# Patient Record
Sex: FEMALE | Race: WHITE | NOT HISPANIC OR LATINO | Employment: STUDENT | URBAN - METROPOLITAN AREA
[De-identification: names, ages, dates, MRNs, and addresses within clinical notes are randomized per-mention and may not be internally consistent; named-entity substitution may affect disease eponyms.]

---

## 2024-04-02 DIAGNOSIS — M54.2 CERVICALGIA: Primary | ICD-10-CM

## 2024-04-02 RX ORDER — METHYLPREDNISOLONE 4 MG/1
TABLET ORAL
Qty: 21 TABLET | Refills: 0 | Status: SHIPPED | OUTPATIENT
Start: 2024-04-02 | End: 2024-04-09

## 2024-04-02 RX ORDER — CYCLOBENZAPRINE HCL 10 MG
10 TABLET ORAL EVERY 8 HOURS PRN
Qty: 30 TABLET | Refills: 1 | Status: SHIPPED | OUTPATIENT
Start: 2024-04-02 | End: 2024-06-01

## 2024-04-26 ENCOUNTER — APPOINTMENT (OUTPATIENT)
Dept: PHYSICAL THERAPY | Facility: CLINIC | Age: 34
End: 2024-04-26
Payer: COMMERCIAL

## 2024-05-10 ENCOUNTER — APPOINTMENT (OUTPATIENT)
Dept: PHYSICAL THERAPY | Facility: CLINIC | Age: 34
End: 2024-05-10
Payer: COMMERCIAL

## 2024-05-31 ENCOUNTER — APPOINTMENT (OUTPATIENT)
Dept: PHYSICAL THERAPY | Facility: CLINIC | Age: 34
End: 2024-05-31
Payer: COMMERCIAL

## 2024-06-17 ENCOUNTER — APPOINTMENT (OUTPATIENT)
Dept: PHYSICAL THERAPY | Facility: CLINIC | Age: 34
End: 2024-06-17
Payer: COMMERCIAL

## 2025-02-13 ENCOUNTER — INITIAL PRENATAL (OUTPATIENT)
Facility: CLINIC | Age: 35
End: 2025-02-13
Payer: COMMERCIAL

## 2025-02-13 VITALS — SYSTOLIC BLOOD PRESSURE: 118 MMHG | BODY MASS INDEX: 22.1 KG/M2 | WEIGHT: 140.8 LBS | DIASTOLIC BLOOD PRESSURE: 78 MMHG

## 2025-02-13 DIAGNOSIS — Z87.59 HISTORY OF CHOLESTASIS DURING PREGNANCY: ICD-10-CM

## 2025-02-13 DIAGNOSIS — O99.119 FACTOR V LEIDEN MUTATION AFFECTING PREGNANCY (MULTI): Primary | ICD-10-CM

## 2025-02-13 DIAGNOSIS — Z87.59 HISTORY OF PLACENTA ABRUPTION: ICD-10-CM

## 2025-02-13 DIAGNOSIS — Z3A.08 8 WEEKS GESTATION OF PREGNANCY (HHS-HCC): ICD-10-CM

## 2025-02-13 DIAGNOSIS — D68.51 FACTOR V LEIDEN MUTATION AFFECTING PREGNANCY (MULTI): Primary | ICD-10-CM

## 2025-02-13 DIAGNOSIS — Z87.19 HISTORY OF CHOLESTASIS DURING PREGNANCY: ICD-10-CM

## 2025-02-13 DIAGNOSIS — O09.299 H/O SHOULDER DYSTOCIA IN PRIOR PREGNANCY, CURRENTLY PREGNANT (HHS-HCC): ICD-10-CM

## 2025-02-13 PROCEDURE — H1000 PRENATAL CARE ATRISK ASSESSM: HCPCS | Performed by: OBSTETRICS & GYNECOLOGY

## 2025-02-13 PROCEDURE — 99214 OFFICE O/P EST MOD 30 MIN: CPT | Performed by: OBSTETRICS & GYNECOLOGY

## 2025-02-13 PROCEDURE — 88175 CYTOPATH C/V AUTO FLUID REDO: CPT

## 2025-02-13 RX ORDER — BUPRENORPHINE HYDROCHLORIDE 8 MG/1
TABLET SUBLINGUAL
COMMUNITY

## 2025-02-13 RX ORDER — BUPRENORPHINE 7.5 UG/H
PATCH TRANSDERMAL
COMMUNITY
Start: 2020-06-13 | End: 2025-02-13 | Stop reason: ALTCHOICE

## 2025-02-13 RX ORDER — CLONAZEPAM 2 MG/1
TABLET ORAL 2 TIMES DAILY PRN
COMMUNITY

## 2025-02-13 RX ORDER — ENOXAPARIN SODIUM 100 MG/ML
40 INJECTION SUBCUTANEOUS 2 TIMES DAILY
Qty: 60 EACH | Refills: 2 | Status: SHIPPED | OUTPATIENT
Start: 2025-02-13

## 2025-02-13 RX ORDER — NAPROXEN SODIUM 220 MG/1
1 TABLET, FILM COATED ORAL DAILY
COMMUNITY
Start: 2022-11-21

## 2025-02-13 RX ORDER — BUPROPION HYDROCHLORIDE 300 MG/1
300 TABLET ORAL
COMMUNITY
Start: 2024-09-19

## 2025-02-13 RX ORDER — FOLIC ACID 1 MG/1
TABLET ORAL
COMMUNITY
Start: 2024-08-06

## 2025-02-13 RX ORDER — DIAZEPAM 10 MG/1
1 TABLET ORAL
COMMUNITY
Start: 2025-02-01

## 2025-02-13 ASSESSMENT — EDINBURGH POSTNATAL DEPRESSION SCALE (EPDS)
TOTAL SCORE: 0
I HAVE FELT SAD OR MISERABLE: NO, NOT AT ALL
I HAVE FELT SCARED OR PANICKY FOR NO GOOD REASON: NO, NOT AT ALL
I HAVE BLAMED MYSELF UNNECESSARILY WHEN THINGS WENT WRONG: NO, NEVER
I HAVE BEEN SO UNHAPPY THAT I HAVE BEEN CRYING: NO, NEVER
I HAVE LOOKED FORWARD WITH ENJOYMENT TO THINGS: AS MUCH AS I EVER DID
I HAVE BEEN ANXIOUS OR WORRIED FOR NO GOOD REASON: NO, NOT AT ALL
THINGS HAVE BEEN GETTING ON TOP OF ME: NO, I HAVE BEEN COPING AS WELL AS EVER
I HAVE BEEN ABLE TO LAUGH AND SEE THE FUNNY SIDE OF THINGS: AS MUCH AS I ALWAYS COULD
I HAVE BEEN SO UNHAPPY THAT I HAVE HAD DIFFICULTY SLEEPING: NOT AT ALL
THE THOUGHT OF HARMING MYSELF HAS OCCURRED TO ME: NEVER

## 2025-02-13 NOTE — PROGRESS NOTES
Ob Visit  25     SUBJECTIVE      HPI: Trinh Fiore is a 34 y.o.  at 8w1d here for RPNV.  She has no contractions, no bleeding, or no LOF. Reports no fetal movement. Patient reports being here for her first OB intake visit.  She has a complicated history including having factor V Leiden deficiency which she is needed Lovenox in the past for.  She also has a history of a shoulder dystocia in the past and a placental abruption in the past.  She also has a history of cholestasis of pregnancy which she delivered at 36 weeks.       OBJECTIVE  Visit Vitals  /78   Wt 63.9 kg (140 lb 12.8 oz)   LMP 2024   BMI 22.10 kg/m²   OB Status Pregnant   Smoking Status Former   BSA 1.74 m²            ASSESSMENT & PLAN    Trinh Fiore is a 34 y.o.  at 8w1d here for the following concerns we addressed today:    Problem List Items Addressed This Visit       Factor V Leiden mutation affecting pregnancy (Multi) - Primary    Overview     Lovenox 40mg bid  Heme onc consult         Relevant Medications    aspirin 81 mg chewable tablet    enoxaparin (Lovenox) 40 mg/0.4 mL syringe    Other Relevant Orders    C. trachomatis / N. gonorrhoeae, Amplified, Urogenital    CBC Anemia Panel With Reflex,Pregnancy    Hemoglobin Identification with Path Review    Hepatitis B Core Antibody, Total    Hepatitis B Surface Antibody    Hepatitis B surface Ag    Hepatitis C Antibody    HIV 1/2 Antigen/Antibody Screen with Reflex to Confirmation    Rubella IgG    Type And Screen Is this order related to pregnancy or an upcoming surgery? No    Urine culture    Syphilis Screen with Reflex    Referral To Hematology and Oncology    [facility] OB imaging order    H/O shoulder dystocia in prior pregnancy, currently pregnant ([facility])    History of placenta abruption    8 weeks gestation of pregnancy ([facility])    Overview     Desired provider in labor: [] CNM  [x] Physician   [] Either Acceptable  [x] Blood Products: [] Yes, accepts  [] No, needs counseling  [x] Initial BMI: 20.40   [x] Prenatal Labs:   [x] Cervical Cancer Screening up to date  [x] Rh status:   [] Screen for IPV and Substance Use Risk:  [] Genetic Screening (cfDNA):    [] First Trimester Anatomy Screen (11-13.6 wks):  [] Baby ASA (initiated):  [] Pregnancy dated by:     [] Anatomy US: (19-20 wks)  [] Federal Sterilization consent signed (if indicated):  [] 1hr GCT at 24-28wks:  [] Rhogam (if indicated):   [] Fetal Surveillance (if indicated):  [] Tdap (27-32 wks, may be given up to 36 wks if initial window missed):   [] RSV (32-36 wks) (Sept. to end of Jan):     [] Feeding Intentions:  [] Postpartum Birth control method:   [] GBS at 36 - 37 wks:  [] 39 weeks discussion of IOL vs. Expectant management:  [] Mode of delivery ( anticipated ):               RTC in 2 weeks      Wilfrido Méndez DO

## 2025-02-15 LAB
BACTERIA UR CULT: NORMAL
C TRACH RRNA SPEC QL NAA+PROBE: NORMAL

## 2025-02-18 DIAGNOSIS — Z3A.08 8 WEEKS GESTATION OF PREGNANCY (HHS-HCC): ICD-10-CM

## 2025-02-18 PROCEDURE — 83021 HEMOGLOBIN CHROMOTOGRAPHY: CPT

## 2025-02-18 PROCEDURE — 86850 RBC ANTIBODY SCREEN: CPT

## 2025-02-18 PROCEDURE — 86900 BLOOD TYPING SEROLOGIC ABO: CPT

## 2025-02-18 PROCEDURE — 85027 COMPLETE CBC AUTOMATED: CPT

## 2025-02-18 PROCEDURE — 86901 BLOOD TYPING SEROLOGIC RH(D): CPT

## 2025-02-19 ENCOUNTER — LAB (OUTPATIENT)
Dept: LAB | Facility: HOSPITAL | Age: 35
End: 2025-02-19
Payer: COMMERCIAL

## 2025-02-19 ENCOUNTER — TELEPHONE (OUTPATIENT)
Dept: OBSTETRICS AND GYNECOLOGY | Facility: CLINIC | Age: 35
End: 2025-02-19

## 2025-02-19 LAB
ABO GROUP (TYPE) IN BLOOD: NORMAL
ANTIBODY SCREEN: NORMAL
C TRACH RRNA SPEC QL NAA+PROBE: NOT DETECTED
ERYTHROCYTE [DISTWIDTH] IN BLOOD BY AUTOMATED COUNT: 12.2 % (ref 11.5–14.5)
HCT VFR BLD AUTO: 34.4 % (ref 36–46)
HGB BLD-MCNC: 11 G/DL (ref 12–16)
MCH RBC QN AUTO: 29.3 PG (ref 26–34)
MCHC RBC AUTO-ENTMCNC: 32 G/DL (ref 32–36)
MCV RBC AUTO: 92 FL (ref 80–100)
N GONORRHOEA RRNA SPEC QL NAA+PROBE: NOT DETECTED
NRBC BLD-RTO: 0 /100 WBCS (ref 0–0)
PLATELET # BLD AUTO: 157 X10*3/UL (ref 150–450)
QUEST GC CT AMPLIFIED (ALWAYS MESSAGE): NORMAL
RBC # BLD AUTO: 3.76 X10*6/UL (ref 4–5.2)
REFLEX ADDED, ANEMIA PANEL: NORMAL
RH FACTOR (ANTIGEN D): NORMAL
WBC # BLD AUTO: 6.5 X10*3/UL (ref 4.4–11.3)

## 2025-02-20 ENCOUNTER — APPOINTMENT (OUTPATIENT)
Facility: CLINIC | Age: 35
End: 2025-02-20
Payer: COMMERCIAL

## 2025-02-20 LAB
HEMOGLOBIN A2: 3 % (ref 2–3.5)
HEMOGLOBIN A: 95.4 % (ref 95.8–98)
HEMOGLOBIN F: 1.6 % (ref 0–2)
HEMOGLOBIN IDENTIFICATION INTERPRETATION: NORMAL
PATH REVIEW-HGB IDENTIFICATION: ABNORMAL

## 2025-02-21 LAB
HBV CORE AB SERPL QL IA: NORMAL
HBV SURFACE AB SERPL IA-ACNC: <5 MIU/ML
HBV SURFACE AG SERPL QL IA: NORMAL
HCV AB SERPL QL IA: NORMAL
HIV 1+2 AB+HIV1 P24 AG SERPL QL IA: NORMAL
RUBV IGG SERPL IA-ACNC: <0.9 INDEX
T PALLIDUM AB SER QL IA: NEGATIVE

## 2025-02-27 LAB
CYTOLOGY CMNT CVX/VAG CYTO-IMP: NORMAL
LAB AP HPV GENOTYPE QUESTION: YES
LAB AP HPV HR: NORMAL
LABORATORY COMMENT REPORT: NORMAL
LMP START DATE: NORMAL
PATH REPORT.TOTAL CANCER: NORMAL

## 2025-03-04 ENCOUNTER — APPOINTMENT (OUTPATIENT)
Dept: RADIOLOGY | Facility: CLINIC | Age: 35
End: 2025-03-04
Payer: COMMERCIAL

## 2025-03-05 ENCOUNTER — HOSPITAL ENCOUNTER (OUTPATIENT)
Dept: RADIOLOGY | Facility: HOSPITAL | Age: 35
Discharge: HOME | End: 2025-03-05
Payer: COMMERCIAL

## 2025-03-05 DIAGNOSIS — D68.51 FACTOR V LEIDEN MUTATION AFFECTING PREGNANCY (MULTI): ICD-10-CM

## 2025-03-05 DIAGNOSIS — O99.119 FACTOR V LEIDEN MUTATION AFFECTING PREGNANCY (MULTI): ICD-10-CM

## 2025-03-05 PROCEDURE — 76813 OB US NUCHAL MEAS 1 GEST: CPT

## 2025-03-11 ENCOUNTER — TELEMEDICINE (OUTPATIENT)
Dept: OBSTETRICS AND GYNECOLOGY | Facility: HOSPITAL | Age: 35
End: 2025-03-11
Payer: COMMERCIAL

## 2025-03-11 ENCOUNTER — APPOINTMENT (OUTPATIENT)
Dept: OBSTETRICS AND GYNECOLOGY | Facility: HOSPITAL | Age: 35
End: 2025-03-11
Payer: COMMERCIAL

## 2025-03-11 ENCOUNTER — PATIENT MESSAGE (OUTPATIENT)
Dept: OBSTETRICS AND GYNECOLOGY | Facility: HOSPITAL | Age: 35
End: 2025-03-11

## 2025-03-11 DIAGNOSIS — O99.320: ICD-10-CM

## 2025-03-11 DIAGNOSIS — F19.20: ICD-10-CM

## 2025-03-11 DIAGNOSIS — D68.51 FACTOR V LEIDEN MUTATION AFFECTING PREGNANCY (MULTI): Primary | ICD-10-CM

## 2025-03-11 DIAGNOSIS — O26.20 HIGH RISK PREGNANCY DUE TO RECURRENT MISCARRIAGE (HHS-HCC): ICD-10-CM

## 2025-03-11 DIAGNOSIS — Z87.59 HISTORY OF CHOLESTASIS DURING PREGNANCY: ICD-10-CM

## 2025-03-11 DIAGNOSIS — O99.341 ANXIETY DURING PREGNANCY IN FIRST TRIMESTER, ANTEPARTUM (HHS-HCC): ICD-10-CM

## 2025-03-11 DIAGNOSIS — O99.119 FACTOR V LEIDEN MUTATION AFFECTING PREGNANCY (MULTI): Primary | ICD-10-CM

## 2025-03-11 DIAGNOSIS — B96.89 ACUTE BACTERIAL SINUSITIS: ICD-10-CM

## 2025-03-11 DIAGNOSIS — F41.9 ANXIETY DURING PREGNANCY IN FIRST TRIMESTER, ANTEPARTUM (HHS-HCC): ICD-10-CM

## 2025-03-11 DIAGNOSIS — J01.90 ACUTE BACTERIAL SINUSITIS: ICD-10-CM

## 2025-03-11 DIAGNOSIS — Z3A.11 11 WEEKS GESTATION OF PREGNANCY (HHS-HCC): ICD-10-CM

## 2025-03-11 DIAGNOSIS — Z87.19 HISTORY OF CHOLESTASIS DURING PREGNANCY: ICD-10-CM

## 2025-03-11 PROBLEM — Q21.12 PATENT FORAMEN OVALE (HHS-HCC): Status: ACTIVE | Noted: 2025-03-11

## 2025-03-11 PROBLEM — O09.891 HISTORY OF PRETERM DELIVERY, CURRENTLY PREGNANT IN FIRST TRIMESTER (HHS-HCC): Status: ACTIVE | Noted: 2025-03-11

## 2025-03-11 PROBLEM — O09.891 HISTORY OF PRETERM DELIVERY, CURRENTLY PREGNANT IN FIRST TRIMESTER (HHS-HCC): Status: RESOLVED | Noted: 2025-03-11 | Resolved: 2025-03-11

## 2025-03-11 PROBLEM — O09.291: Status: ACTIVE | Noted: 2025-02-13

## 2025-03-11 PROCEDURE — 99214 OFFICE O/P EST MOD 30 MIN: CPT | Performed by: STUDENT IN AN ORGANIZED HEALTH CARE EDUCATION/TRAINING PROGRAM

## 2025-03-11 RX ORDER — ENOXAPARIN SODIUM 100 MG/ML
40 INJECTION SUBCUTANEOUS DAILY
Qty: 90 EACH | Refills: 3 | Status: SHIPPED | OUTPATIENT
Start: 2025-03-11 | End: 2026-03-11

## 2025-03-11 RX ORDER — LORAZEPAM 2 MG/1
2 TABLET ORAL 2 TIMES DAILY
COMMUNITY

## 2025-03-11 RX ORDER — AMOXICILLIN AND CLAVULANATE POTASSIUM 875; 125 MG/1; MG/1
875 TABLET, FILM COATED ORAL 2 TIMES DAILY
Qty: 14 TABLET | Refills: 0 | Status: SHIPPED | OUTPATIENT
Start: 2025-03-11 | End: 2025-03-18

## 2025-03-11 NOTE — PROGRESS NOTES
Ob Visit  25     SUBJECTIVE      HPI: Trinh Fiore is a 34 y.o.  at 11w6d here for RPNV.  She has no contractions, no bleeding, and no LOF.     Patient reports upper respiratory symptoms 2-3 weeks. Developed purulent sputum in the last week. Symptoms essentially stable. Partner's brother passed away very suddenly recently and this has been a source of stress.    Due to an acute illness in her child necessitating alternate childcare arrangements today, she was unable to present in person and today's visit was completed by phone.       OBJECTIVE  Visit Vitals  LMP 2024   OB Status Pregnant   Smoking Status Former            ASSESSMENT & PLAN    Trinh Fiore is a 34 y.o.  at 11w6d here for the following concerns we addressed today:    Substance dependence during pregnancy, antepartum (Multi)  - in recovery  - sober of opiates since 2018  - continues on maintenance therapy with Subutex 8 mg recently uptitrated to four times daily  - interested in referral to [facility], will refer for shared care    11 weeks gestation of pregnancy ([facility])  - care complete to date  - cf DNA ordered today, aware she will need to retrieve a kit from office along with paper order forms    Factor V Leiden mutation affecting pregnancy (Multi)  - heterozygous  - no personal history of VTE, only superficial phlebitis, but was treated with anticoagulation given superficial clot in context of PFO  - mom with TIA leading to diagnosis of factor V, had episodes of VTE as well  - per ACOG guidance as well as MFM consultation in  discussed recommendation for prophylactic dosing of Lovenox with 40 mg daily    Anxiety during pregnancy in first trimester, antepartum ([facility])  - increased symptomatology in the last year  - experiences panic attacks, also with increasing frequency recently  - current regimen Wellbutrin 300 mg daily, clonazepam 2 mg twice daily PRN (endorses consistent twice daily use,  wants to wean), and Ativan 2 mg twice daily PRN (endorses consistent twice daily use, wants to wean)  - interested in referral to Dr. Hare, will place referral    History of cholestasis during pregnancy  - experiencing generalized pruritus, discussed highly unlikely to be related to cholestasis, CMP and bile acids obtained for reassurance        Orders Placed This Encounter   Procedures    Bile Acids, Total    Comprehensive Metabolic Panel    [facility] Prenatal Screen     Acute bacterial sinusitis  - based on history  - reviewed current evidence that even bacterial sinusitis may resolve without treatment but that antibiotic therapy may shorten course and she does desire antibiotics at this time  - rx sent for augmentin    RTC in 4 weeks      Peace Fierro MD

## 2025-03-13 ENCOUNTER — TELEPHONE (OUTPATIENT)
Dept: OBSTETRICS AND GYNECOLOGY | Facility: CLINIC | Age: 35
End: 2025-03-13

## 2025-03-13 ENCOUNTER — APPOINTMENT (OUTPATIENT)
Facility: CLINIC | Age: 35
End: 2025-03-13
Payer: COMMERCIAL

## 2025-03-13 PROBLEM — O99.341 ANXIETY DURING PREGNANCY IN FIRST TRIMESTER, ANTEPARTUM (HHS-HCC): Status: ACTIVE | Noted: 2025-03-13

## 2025-03-13 PROBLEM — F41.9 ANXIETY DURING PREGNANCY IN FIRST TRIMESTER, ANTEPARTUM (HHS-HCC): Status: ACTIVE | Noted: 2025-03-13

## 2025-03-13 NOTE — ASSESSMENT & PLAN NOTE
- increased symptomatology in the last year  - experiences panic attacks, also with increasing frequency recently  - current regimen Wellbutrin 300 mg daily, clonazepam 2 mg twice daily PRN (endorses consistent twice daily use, wants to wean), and Ativan 2 mg twice daily PRN (endorses consistent twice daily use, wants to wean)  - interested in referral to Dr. Hare, will place referral

## 2025-03-13 NOTE — TELEPHONE ENCOUNTER
HENRIETTA/TERRI called patient after receiving a referral from Peace Fierro MD. SW introduced self, explained role and provided additional information about [facility]. SW addressed questions scheduled patient in [facility] and concluded on a date and time to complete intake. HENRIETTA provided contact information and address to [place] and encouraged patient to contact HENRIETTA/TERRI if she have additional questions or needs before scheduled appointment.      no

## 2025-03-13 NOTE — ASSESSMENT & PLAN NOTE
- in recovery  - sober of opiates since August 2018  - continues on maintenance therapy with Subutex 8 mg recently uptitrated to four times daily  - interested in referral to [facility], will refer for shared care

## 2025-03-13 NOTE — ASSESSMENT & PLAN NOTE
- experiencing generalized pruritus, discussed highly unlikely to be related to cholestasis, CMP and bile acids obtained for reassurance

## 2025-03-13 NOTE — ASSESSMENT & PLAN NOTE
- care complete to date  - cf DNA ordered today, aware she will need to retrieve a kit from office along with paper order forms

## 2025-03-13 NOTE — ASSESSMENT & PLAN NOTE
- heterozygous  - no personal history of VTE, only superficial phlebitis, but was treated with anticoagulation given superficial clot in context of PFO  - mom with TIA leading to diagnosis of factor V, had episodes of VTE as well  - per ACOG guidance as well as M consultation in 2022 discussed recommendation for prophylactic dosing of Lovenox with 40 mg daily

## 2025-03-14 ENCOUNTER — TELEPHONE (OUTPATIENT)
Dept: OBSTETRICS AND GYNECOLOGY | Facility: HOSPITAL | Age: 35
End: 2025-03-14

## 2025-03-14 DIAGNOSIS — O99.341 ANXIETY DURING PREGNANCY IN FIRST TRIMESTER, ANTEPARTUM (HHS-HCC): Primary | ICD-10-CM

## 2025-03-14 DIAGNOSIS — F41.9 ANXIETY DURING PREGNANCY IN FIRST TRIMESTER, ANTEPARTUM (HHS-HCC): Primary | ICD-10-CM

## 2025-03-14 PROCEDURE — 36415 COLL VENOUS BLD VENIPUNCTURE: CPT

## 2025-03-15 ENCOUNTER — LAB (OUTPATIENT)
Dept: LAB | Facility: HOSPITAL | Age: 35
End: 2025-03-15
Payer: COMMERCIAL

## 2025-03-15 ENCOUNTER — TELEPHONE (OUTPATIENT)
Dept: OBSTETRICS AND GYNECOLOGY | Facility: HOSPITAL | Age: 35
End: 2025-03-15
Payer: COMMERCIAL

## 2025-03-15 DIAGNOSIS — O26.20 PREGNANCY CARE FOR PATIENT WITH RECURRENT PREGNANCY LOSS, UNSPECIFIED TRIMESTER: Primary | ICD-10-CM

## 2025-03-15 NOTE — TELEPHONE ENCOUNTER
----- Message from Nurse Yolette MEDRANO sent at 3/14/2025  6:14 PM EDT -----  Regarding: Panic attack in pregnancy  Hi Tameka, I am not very good at scheduling. I put this pt on your schedule for Tuesday at 10:30am. If you have to remove it I understand. She is having a lot of anxiety and panic attacks with this pregnancy. She is a pt of Dr. Fierro.  She has a prescriber for her psych meds however he is currently booked. She has a therapist she talks too and will talk to her on Saturday, she has coping strategies but nothing is working. She has other children and can't go to the ED. Let me Know if this is okay or not. I'm really concerned for her. Thanks      Called patient to talk about the fact that her appointment time on Tuesday wouldn't work given the other patients currently on my schedule. Identity confirmed x2. Patient states that she actually doesn't want to keep the appointment with me as she got an appointment with Dr Orlando for 3/20. Discussed I would cancel appointment with me, but that if she needed anything to call back. Patient voiced acceptance and understanding of plan of care. All questions answered.     Tameka Espinal, JERE-LEXI

## 2025-03-17 ENCOUNTER — APPOINTMENT (OUTPATIENT)
Dept: OBSTETRICS AND GYNECOLOGY | Facility: HOSPITAL | Age: 35
End: 2025-03-17
Payer: COMMERCIAL

## 2025-03-18 ENCOUNTER — APPOINTMENT (OUTPATIENT)
Dept: OBSTETRICS AND GYNECOLOGY | Facility: CLINIC | Age: 35
End: 2025-03-18
Payer: COMMERCIAL

## 2025-03-18 ENCOUNTER — TELEPHONE (OUTPATIENT)
Dept: OBSTETRICS AND GYNECOLOGY | Facility: CLINIC | Age: 35
End: 2025-03-18
Payer: COMMERCIAL

## 2025-03-18 NOTE — TELEPHONE ENCOUNTER
----- Message from Nurse Sabine QUESADA sent at 3/17/2025  2:43 PM EDT -----  Regarding: Psych appointments  Offer 3/26 1600 at midwn in person with Dr Hare.   If doesn't want to wait that long, she can see Darion Nieves on 3/20 (Thursday) at 1500. and then see Payam on 3/26.    Called patient, left message on voice mail.    3/18/2025 - Reached patient by phone, agreeable to waiting until 3/26/25 to see Dr Hare.  States does not need to see Dr Orlando this week.  Dr Orlando appointment cancelled.  Dr Holliday appointment request sent to clerical staff.

## 2025-03-19 RX ORDER — HYDROXYZINE HYDROCHLORIDE 10 MG/1
25 TABLET, FILM COATED ORAL EVERY 4 HOURS PRN
Qty: 90 TABLET | Refills: 3 | Status: SHIPPED | OUTPATIENT
Start: 2025-03-19 | End: 2026-03-19

## 2025-03-19 RX ORDER — TRAZODONE HYDROCHLORIDE 50 MG/1
50 TABLET ORAL NIGHTLY
Qty: 30 TABLET | Refills: 0 | Status: SHIPPED | OUTPATIENT
Start: 2025-03-19 | End: 2025-04-18

## 2025-03-20 ENCOUNTER — APPOINTMENT (OUTPATIENT)
Dept: OBSTETRICS AND GYNECOLOGY | Facility: CLINIC | Age: 35
End: 2025-03-20
Payer: COMMERCIAL

## 2025-03-21 LAB
COMMENTS - MP RESULT TYPE: NORMAL
SCAN RESULT: NORMAL

## 2025-03-26 ENCOUNTER — TELEMEDICINE (OUTPATIENT)
Dept: BEHAVIORAL HEALTH | Facility: CLINIC | Age: 35
End: 2025-03-26
Payer: COMMERCIAL

## 2025-03-26 DIAGNOSIS — F11.20: ICD-10-CM

## 2025-03-26 DIAGNOSIS — F41.1 GAD (GENERALIZED ANXIETY DISORDER): ICD-10-CM

## 2025-03-26 DIAGNOSIS — O99.342 DEPRESSION AFFECTING PREGNANCY IN SECOND TRIMESTER, ANTEPARTUM (HHS-HCC): ICD-10-CM

## 2025-03-26 DIAGNOSIS — F32.A DEPRESSION AFFECTING PREGNANCY IN SECOND TRIMESTER, ANTEPARTUM (HHS-HCC): ICD-10-CM

## 2025-03-26 PROCEDURE — 90792 PSYCH DIAG EVAL W/MED SRVCS: CPT | Performed by: STUDENT IN AN ORGANIZED HEALTH CARE EDUCATION/TRAINING PROGRAM
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
## 2025-03-27 ENCOUNTER — TELEPHONE (OUTPATIENT)
Dept: OBSTETRICS AND GYNECOLOGY | Facility: CLINIC | Age: 35
End: 2025-03-27
Payer: COMMERCIAL

## 2025-03-27 RX ORDER — BUPRENORPHINE HYDROCHLORIDE 8 MG/1
8 TABLET SUBLINGUAL 4 TIMES DAILY
Qty: 120 TABLET | Refills: 2 | Status: SHIPPED | OUTPATIENT
Start: 2025-04-14 | End: 2025-07-13

## 2025-03-27 RX ORDER — BUPROPION HYDROCHLORIDE 150 MG/1
150 TABLET ORAL EVERY MORNING
Qty: 30 TABLET | Refills: 11 | Status: SHIPPED | OUTPATIENT
Start: 2025-03-27 | End: 2026-03-27

## 2025-03-27 RX ORDER — CLONAZEPAM 2 MG/1
2 TABLET ORAL 3 TIMES DAILY PRN
Qty: 45 TABLET | Refills: 1 | Status: SHIPPED | OUTPATIENT
Start: 2025-04-09 | End: 2025-03-28 | Stop reason: SDUPTHER

## 2025-03-27 NOTE — TELEPHONE ENCOUNTER
[facility] Coordinator called patient as scheduled attempting to complete intake. SW left a message and requested a call back. SW also observed that the appointment scheduled for patient in [facility] clinic on 4/1 was cancelled.

## 2025-03-28 ENCOUNTER — TELEPHONE (OUTPATIENT)
Dept: OBSTETRICS AND GYNECOLOGY | Facility: CLINIC | Age: 35
End: 2025-03-28
Payer: COMMERCIAL

## 2025-03-28 DIAGNOSIS — F41.1 GAD (GENERALIZED ANXIETY DISORDER): ICD-10-CM

## 2025-03-28 RX ORDER — CLONAZEPAM 2 MG/1
2 TABLET ORAL 3 TIMES DAILY PRN
Qty: 45 TABLET | Refills: 1 | Status: SHIPPED | OUTPATIENT
Start: 2025-03-28 | End: 2025-04-27

## 2025-03-28 NOTE — TELEPHONE ENCOUNTER
[facility] Coordinator received a call from patient stating that she missed a call regarding intake appointment. Patient explained that she has been linked with Dr. Hare and has all other necessary resources so she no longer wish to be scheduled in [facility] clinic. Coordinator encouraged patient to contact Coordinator if something changes or if she have questions or needs.

## 2025-04-01 ENCOUNTER — APPOINTMENT (OUTPATIENT)
Dept: OBSTETRICS AND GYNECOLOGY | Facility: CLINIC | Age: 35
End: 2025-04-01
Payer: COMMERCIAL

## 2025-04-01 DIAGNOSIS — F11.20: ICD-10-CM

## 2025-04-01 RX ORDER — BUPRENORPHINE HYDROCHLORIDE 8 MG/1
8 TABLET SUBLINGUAL 4 TIMES DAILY
Qty: 120 TABLET | Refills: 2 | Status: SHIPPED | OUTPATIENT
Start: 2025-04-01 | End: 2025-06-30

## 2025-04-03 ENCOUNTER — TELEPHONE (OUTPATIENT)
Dept: HEMATOLOGY/ONCOLOGY | Facility: HOSPITAL | Age: 35
End: 2025-04-03
Payer: COMMERCIAL

## 2025-04-03 NOTE — TELEPHONE ENCOUNTER
Call placed to patient regarding missed new patient visit with Dr. Garcia. Not able to reach patient left VM, informing patient to return phone call to be rescheduled with Dr. Garcia.

## 2025-04-04 ENCOUNTER — DOCUMENTATION (OUTPATIENT)
Dept: HEMATOLOGY/ONCOLOGY | Facility: HOSPITAL | Age: 35
End: 2025-04-04
Payer: COMMERCIAL

## 2025-04-04 ENCOUNTER — APPOINTMENT (OUTPATIENT)
Dept: RADIOLOGY | Facility: HOSPITAL | Age: 35
End: 2025-04-04
Payer: COMMERCIAL

## 2025-04-04 NOTE — PROGRESS NOTES
Dr. Fierro and Dr. Méndez aware that patient missed her benign heme appointment with Dr. Garcia. Dr. Fierro said that she will manage the patient.

## 2025-04-08 ENCOUNTER — TELEPHONE (OUTPATIENT)
Dept: BEHAVIORAL HEALTH | Facility: CLINIC | Age: 35
End: 2025-04-08
Payer: COMMERCIAL

## 2025-04-08 DIAGNOSIS — F41.1 GAD (GENERALIZED ANXIETY DISORDER): ICD-10-CM

## 2025-04-08 RX ORDER — CLONAZEPAM 1 MG/1
1 TABLET ORAL NIGHTLY
Qty: 14 TABLET | Refills: 0 | Status: SHIPPED | OUTPATIENT
Start: 2025-04-08 | End: 2025-04-22

## 2025-04-09 ENCOUNTER — TELEMEDICINE (OUTPATIENT)
Dept: PRIMARY CARE | Facility: CLINIC | Age: 35
End: 2025-04-09
Payer: COMMERCIAL

## 2025-04-09 DIAGNOSIS — J00 ACUTE NASOPHARYNGITIS: Primary | ICD-10-CM

## 2025-04-09 PROCEDURE — 99202 OFFICE O/P NEW SF 15 MIN: CPT | Performed by: NURSE PRACTITIONER

## 2025-04-09 PROCEDURE — 1036F TOBACCO NON-USER: CPT | Performed by: NURSE PRACTITIONER

## 2025-04-09 NOTE — PROGRESS NOTES
Subjective   Patient ID: Trinh Fiore is a 34 y.o. female who presents for Illness.    Virtual or Telephone Consent    An interactive audio and video telecommunication system which permits real time communications between the patient (at the originating site) and provider (at the distant site) was utilized to provide this telehealth service.   Verbal consent was requested and obtained from Trinh Fiore on this date, 04/09/25 for a telehealth visit and the patient's location was confirmed at the time of the visit.  Patient is located in [place]    She woke up Monday and had sore throat, congestion, and headache started and through the day worsened.  She felt fatigued as well.   Today she feels slight improvement.  She has felt warm but did not take temperature.    She denies shortness of breath or chest pain, red cheeks, or rash  Her 12 year old became sick yesterday morning when he woke up  Her 2 year old became sick yesterday night with same symptoms - he does attend    Her 4 year old has not become ill yet  Currently pregnant 16 weeks - did talk with GYN about what she could take.  She has been taking tylenol cold         Illness         Review of Systems   All other systems reviewed and are negative.      Objective   LMP 12/18/2024     Physical Exam  Constitutional:       General: She is not in acute distress.     Appearance: Normal appearance.   HENT:      Head: Normocephalic and atraumatic.      Right Ear: External ear normal.      Left Ear: External ear normal.      Nose: Nose normal.      Mouth/Throat:      Mouth: Mucous membranes are moist.   Eyes:      Extraocular Movements: Extraocular movements intact.      Conjunctiva/sclera: Conjunctivae normal.      Pupils: Pupils are equal, round, and reactive to light.   Pulmonary:      Effort: Pulmonary effort is normal.   Neurological:      Mental Status: She is alert and oriented to person, place, and time.   Psychiatric:         Mood and Affect:  Mood normal.         Behavior: Behavior normal.         Thought Content: Thought content normal.         Judgment: Judgment normal.         Assessment/Plan   Problem List Items Addressed This Visit             ICD-10-CM    Acute nasopharyngitis - Primary J00     - discussed medications safe in pregnancy.  May continue tylenol cold.  May take OTC robitussin   -  increase fluids  -  nasal saline  -  hand hygiene and infection prevention discussed  - follow up with pcp if no improvement in 3-5 days

## 2025-04-09 NOTE — ASSESSMENT & PLAN NOTE
- discussed medications safe in pregnancy.  May continue tylenol cold.  May take OTC robitussin   -  increase fluids  -  nasal saline  -  hand hygiene and infection prevention discussed  - follow up with pcp if no improvement in 3-5 days

## 2025-04-11 ENCOUNTER — APPOINTMENT (OUTPATIENT)
Dept: OBSTETRICS AND GYNECOLOGY | Facility: CLINIC | Age: 35
End: 2025-04-11
Payer: COMMERCIAL

## 2025-04-11 ENCOUNTER — PATIENT OUTREACH (OUTPATIENT)
Dept: HEMATOLOGY/ONCOLOGY | Facility: HOSPITAL | Age: 35
End: 2025-04-11

## 2025-04-11 NOTE — PROGRESS NOTES
RN talked to patient. Patient was referred by Dr. Méndez for factor V Leiden during pregnancy. Patient was tested in 2014 at [facility] and the result is in care every where. Patient was started on Lovenox by her OB. Patient is due 9/24/25. Patient aware of date, time and place. Call back instructions reviewed.

## 2025-04-14 ENCOUNTER — APPOINTMENT (OUTPATIENT)
Dept: HEMATOLOGY/ONCOLOGY | Facility: HOSPITAL | Age: 35
End: 2025-04-14
Payer: COMMERCIAL

## 2025-04-14 ENCOUNTER — TELEPHONE (OUTPATIENT)
Dept: BEHAVIORAL HEALTH | Facility: CLINIC | Age: 35
End: 2025-04-14
Payer: COMMERCIAL

## 2025-04-14 ENCOUNTER — APPOINTMENT (OUTPATIENT)
Dept: BEHAVIORAL HEALTH | Facility: CLINIC | Age: 35
End: 2025-04-14
Payer: COMMERCIAL

## 2025-04-14 DIAGNOSIS — F41.1 GAD (GENERALIZED ANXIETY DISORDER): ICD-10-CM

## 2025-04-14 RX ORDER — CLONAZEPAM 2 MG/1
TABLET ORAL
Qty: 105 TABLET | Refills: 0 | Status: SHIPPED | OUTPATIENT
Start: 2025-04-14 | End: 2025-04-18 | Stop reason: SDUPTHER

## 2025-04-14 NOTE — PROGRESS NOTES
Moreno the pharmacist is calling in from [facility] regarding medication below. He would like to speak with the provider. He states that the pts just filled a 15 day supply on the medication below on 04/09/2025 and he received another request to fill again. He also stated that it will be a 45 day supply in twelve days and he has questions for the provider. Please call [phone] opt 2 to speak directly to pharmacist.    clonazePAM (KlonoPIN) 2 mg tablet

## 2025-04-14 NOTE — TELEPHONE ENCOUNTER
Can you let them know it can be filled when the 14 day ones run out on 4/23, that I was just trying to consolidate the 1 mg and 2 mg tablets and change to a 30 day to start once the 2 week scripts finished.

## 2025-04-16 ENCOUNTER — TELEMEDICINE (OUTPATIENT)
Dept: PRIMARY CARE | Facility: CLINIC | Age: 35
End: 2025-04-16
Payer: COMMERCIAL

## 2025-04-16 DIAGNOSIS — J01.00 ACUTE NON-RECURRENT MAXILLARY SINUSITIS: Primary | ICD-10-CM

## 2025-04-16 PROCEDURE — 1036F TOBACCO NON-USER: CPT | Performed by: NURSE PRACTITIONER

## 2025-04-16 PROCEDURE — 99214 OFFICE O/P EST MOD 30 MIN: CPT | Performed by: NURSE PRACTITIONER

## 2025-04-16 RX ORDER — AMOXICILLIN AND CLAVULANATE POTASSIUM 875; 125 MG/1; MG/1
1 TABLET, FILM COATED ORAL 2 TIMES DAILY
Qty: 14 TABLET | Refills: 0 | Status: SHIPPED | OUTPATIENT
Start: 2025-04-16 | End: 2025-04-23

## 2025-04-16 NOTE — PROGRESS NOTES
Subjective   Patient ID: Trinh Fiore is a 34 y.o. female who presents for No chief complaint on file..    Virtual or Telephone Consent    An interactive audio and video telecommunication system which permits real time communications between the patient (at the originating site) and provider (at the distant site) was utilized to provide this telehealth service.   Verbal consent was requested and obtained from Trinh Fiore on this date, 04/16/25 for a telehealth visit and the patient's location was confirmed at the time of the visit.  Patient is located in [place]      Was seen one week ago for uri - symptoms have worsened of sinus congestion, rhinorrhea, sore throat, and productive cough x 11 days. Cough is productive of green mucous.  She denies fever, shortness of breath, or chest pain  She denies wheezing  She does notice cough worsens when she lays down.  She has been taking tylenol cold as directed by OB/GYN - but is not having improvement.  Her children who were sick are feeling better   She is 17 weeks pregnant           Review of Systems   All other systems reviewed and are negative.      Objective   LMP 12/18/2024     Physical Exam  Constitutional:       General: She is not in acute distress.     Appearance: Normal appearance. She is not toxic-appearing or diaphoretic.   HENT:      Head: Normocephalic and atraumatic.      Right Ear: External ear normal.      Left Ear: External ear normal.      Nose: Nose normal.      Mouth/Throat:      Mouth: Mucous membranes are moist.   Eyes:      Extraocular Movements: Extraocular movements intact.      Conjunctiva/sclera: Conjunctivae normal.      Pupils: Pupils are equal, round, and reactive to light.   Pulmonary:      Effort: Pulmonary effort is normal.   Neurological:      Mental Status: She is alert and oriented to person, place, and time.   Psychiatric:         Mood and Affect: Mood normal.         Behavior: Behavior normal.         Thought Content: Thought  content normal.         Judgment: Judgment normal.         Assessment/Plan   Problem List Items Addressed This Visit           ICD-10-CM    Acute non-recurrent maxillary sinusitis - Primary J01.00    - augmentin twice a day x 7 days  -  Nasal saline, increase fluids  -  hand hygiene and infection prevention discussed  -  Warm compress to sinuses  - humidification  - recommend to eat yogurt twice daily while taking antibiotic or a daily probiotic

## 2025-04-16 NOTE — ASSESSMENT & PLAN NOTE
- augmentin twice a day x 7 days  -  Nasal saline, increase fluids  -  hand hygiene and infection prevention discussed  -  Warm compress to sinuses  - humidification  - recommend to eat yogurt twice daily while taking antibiotic or a daily probiotic

## 2025-04-17 ENCOUNTER — TELEPHONE (OUTPATIENT)
Dept: BEHAVIORAL HEALTH | Facility: CLINIC | Age: 35
End: 2025-04-17
Payer: COMMERCIAL

## 2025-04-17 ENCOUNTER — PATIENT MESSAGE (OUTPATIENT)
Dept: OBSTETRICS AND GYNECOLOGY | Facility: CLINIC | Age: 35
End: 2025-04-17
Payer: COMMERCIAL

## 2025-04-17 DIAGNOSIS — F41.1 GAD (GENERALIZED ANXIETY DISORDER): ICD-10-CM

## 2025-04-17 NOTE — PROGRESS NOTES
Pt calling in. She states that she was talking to Dr. Hare's nurse earlier and would like her to give her a call back. [phone]

## 2025-04-18 RX ORDER — CLONAZEPAM 2 MG/1
TABLET ORAL
Qty: 105 TABLET | Refills: 0 | Status: SHIPPED | OUTPATIENT
Start: 2025-04-18 | End: 2025-05-18

## 2025-04-21 ENCOUNTER — APPOINTMENT (OUTPATIENT)
Dept: BEHAVIORAL HEALTH | Facility: HOSPITAL | Age: 35
End: 2025-04-21
Payer: COMMERCIAL

## 2025-04-22 ENCOUNTER — APPOINTMENT (OUTPATIENT)
Dept: OBSTETRICS AND GYNECOLOGY | Facility: HOSPITAL | Age: 35
End: 2025-04-22
Payer: COMMERCIAL

## 2025-04-25 ENCOUNTER — PATIENT MESSAGE (OUTPATIENT)
Dept: OBSTETRICS AND GYNECOLOGY | Facility: HOSPITAL | Age: 35
End: 2025-04-25

## 2025-04-25 ENCOUNTER — APPOINTMENT (OUTPATIENT)
Dept: OBSTETRICS AND GYNECOLOGY | Facility: CLINIC | Age: 35
End: 2025-04-25
Payer: COMMERCIAL

## 2025-04-25 DIAGNOSIS — N76.0 ACUTE VAGINITIS: ICD-10-CM

## 2025-04-25 DIAGNOSIS — N76.0 ACUTE VAGINITIS: Primary | ICD-10-CM

## 2025-04-25 RX ORDER — FLUCONAZOLE 150 MG/1
150 TABLET ORAL ONCE
Qty: 2 TABLET | Refills: 0 | Status: SHIPPED | OUTPATIENT
Start: 2025-04-25 | End: 2025-04-25

## 2025-04-25 RX ORDER — FLUCONAZOLE 150 MG/1
150 TABLET ORAL ONCE
Qty: 2 TABLET | Refills: 0 | Status: SHIPPED | OUTPATIENT
Start: 2025-04-25 | End: 2025-04-25 | Stop reason: SDUPTHER

## 2025-05-02 ENCOUNTER — HOSPITAL ENCOUNTER (OUTPATIENT)
Facility: HOSPITAL | Age: 35
Discharge: HOME | End: 2025-05-03
Attending: STUDENT IN AN ORGANIZED HEALTH CARE EDUCATION/TRAINING PROGRAM | Admitting: STUDENT IN AN ORGANIZED HEALTH CARE EDUCATION/TRAINING PROGRAM
Payer: COMMERCIAL

## 2025-05-02 ENCOUNTER — APPOINTMENT (OUTPATIENT)
Dept: RADIOLOGY | Facility: HOSPITAL | Age: 35
End: 2025-05-02
Payer: COMMERCIAL

## 2025-05-02 DIAGNOSIS — M54.12 CERVICAL RADICULOPATHY: Primary | ICD-10-CM

## 2025-05-02 PROCEDURE — 4500999001 HC ED NO CHARGE

## 2025-05-02 PROCEDURE — 99214 OFFICE O/P EST MOD 30 MIN: CPT

## 2025-05-02 PROCEDURE — 2500000002 HC RX 250 W HCPCS SELF ADMINISTERED DRUGS (ALT 637 FOR MEDICARE OP, ALT 636 FOR OP/ED): Mod: SE

## 2025-05-02 PROCEDURE — 2500000001 HC RX 250 WO HCPCS SELF ADMINISTERED DRUGS (ALT 637 FOR MEDICARE OP): Mod: SE

## 2025-05-02 RX ORDER — CLONAZEPAM 0.5 MG/1
1 TABLET ORAL ONCE
Status: COMPLETED | OUTPATIENT
Start: 2025-05-02 | End: 2025-05-02

## 2025-05-02 RX ORDER — ACETAMINOPHEN 325 MG/1
975 TABLET ORAL ONCE
Status: COMPLETED | OUTPATIENT
Start: 2025-05-02 | End: 2025-05-02

## 2025-05-02 RX ORDER — BUPRENORPHINE HYDROCHLORIDE 8 MG/1
8 TABLET SUBLINGUAL ONCE
Status: COMPLETED | OUTPATIENT
Start: 2025-05-02 | End: 2025-05-02

## 2025-05-02 RX ADMIN — CLONAZEPAM 1 MG: 0.5 TABLET ORAL at 23:07

## 2025-05-02 RX ADMIN — BUPRENORPHINE 8 MG: 8 TABLET SUBLINGUAL at 23:04

## 2025-05-02 RX ADMIN — ACETAMINOPHEN 975 MG: 325 TABLET, FILM COATED ORAL at 23:04

## 2025-05-02 SDOH — HEALTH STABILITY: MENTAL HEALTH: SUICIDAL BEHAVIOR (LIFETIME): NO

## 2025-05-02 SDOH — HEALTH STABILITY: MENTAL HEALTH: HAVE YOU USED ANY PRESCRIPTION DRUGS OTHER THAN PRESCRIBED IN THE PAST 12 MONTHS?: NO

## 2025-05-02 SDOH — HEALTH STABILITY: MENTAL HEALTH: NON-SPECIFIC ACTIVE SUICIDAL THOUGHTS (PAST 1 MONTH): NO

## 2025-05-02 SDOH — SOCIAL STABILITY: SOCIAL INSECURITY: HAVE YOU HAD ANY THOUGHTS OF HARMING ANYONE ELSE?: NO

## 2025-05-02 SDOH — ECONOMIC STABILITY: HOUSING INSECURITY: DO YOU FEEL UNSAFE GOING BACK TO THE PLACE WHERE YOU ARE LIVING?: NO

## 2025-05-02 SDOH — HEALTH STABILITY: MENTAL HEALTH: WISH TO BE DEAD (PAST 1 MONTH): NO

## 2025-05-02 SDOH — SOCIAL STABILITY: SOCIAL INSECURITY: ARE THERE ANY APPARENT SIGNS OF INJURIES/BEHAVIORS THAT COULD BE RELATED TO ABUSE/NEGLECT?: NO

## 2025-05-02 SDOH — SOCIAL STABILITY: SOCIAL INSECURITY: DOES ANYONE TRY TO KEEP YOU FROM HAVING/CONTACTING OTHER FRIENDS OR DOING THINGS OUTSIDE YOUR HOME?: NO

## 2025-05-02 SDOH — HEALTH STABILITY: MENTAL HEALTH: WERE YOU ABLE TO COMPLETE ALL THE BEHAVIORAL HEALTH SCREENINGS?: YES

## 2025-05-02 SDOH — SOCIAL STABILITY: SOCIAL INSECURITY: ARE YOU OR HAVE YOU BEEN THREATENED OR ABUSED PHYSICALLY, EMOTIONALLY, OR SEXUALLY BY ANYONE?: NO

## 2025-05-02 SDOH — SOCIAL STABILITY: SOCIAL INSECURITY: PHYSICAL ABUSE: DENIES

## 2025-05-02 SDOH — SOCIAL STABILITY: SOCIAL INSECURITY: HAVE YOU HAD THOUGHTS OF HARMING ANYONE ELSE?: NO

## 2025-05-02 SDOH — SOCIAL STABILITY: SOCIAL INSECURITY: ABUSE SCREEN: ADULT

## 2025-05-02 SDOH — SOCIAL STABILITY: SOCIAL INSECURITY: DO YOU FEEL ANYONE HAS EXPLOITED OR TAKEN ADVANTAGE OF YOU FINANCIALLY OR OF YOUR PERSONAL PROPERTY?: NO

## 2025-05-02 SDOH — SOCIAL STABILITY: SOCIAL INSECURITY: HAS ANYONE EVER THREATENED TO HURT YOUR FAMILY OR YOUR PETS?: NO

## 2025-05-02 SDOH — HEALTH STABILITY: MENTAL HEALTH: HAVE YOU USED ANY SUBSTANCES (CANABIS, COCAINE, HEROIN, HALLUCINOGENS, INHALANTS, ETC.) IN THE PAST 12 MONTHS?: NO

## 2025-05-02 SDOH — SOCIAL STABILITY: SOCIAL INSECURITY: VERBAL ABUSE: DENIES

## 2025-05-02 ASSESSMENT — PAIN SCALES - GENERAL
PAINLEVEL_OUTOF10: 10 - WORST POSSIBLE PAIN
PAINLEVEL_OUTOF10: 9

## 2025-05-02 ASSESSMENT — COLUMBIA-SUICIDE SEVERITY RATING SCALE - C-SSRS
2. HAVE YOU ACTUALLY HAD ANY THOUGHTS OF KILLING YOURSELF?: NO
1. IN THE PAST MONTH, HAVE YOU WISHED YOU WERE DEAD OR WISHED YOU COULD GO TO SLEEP AND NOT WAKE UP?: NO
6. HAVE YOU EVER DONE ANYTHING, STARTED TO DO ANYTHING, OR PREPARED TO DO ANYTHING TO END YOUR LIFE?: NO

## 2025-05-02 ASSESSMENT — PATIENT HEALTH QUESTIONNAIRE - PHQ9
1. LITTLE INTEREST OR PLEASURE IN DOING THINGS: NOT AT ALL
SUM OF ALL RESPONSES TO PHQ9 QUESTIONS 1 & 2: 0
2. FEELING DOWN, DEPRESSED OR HOPELESS: NOT AT ALL

## 2025-05-02 ASSESSMENT — PAIN DESCRIPTION - LOCATION: LOCATION: BACK

## 2025-05-02 ASSESSMENT — LIFESTYLE VARIABLES
EVER HAD A DRINK FIRST THING IN THE MORNING TO STEADY YOUR NERVES TO GET RID OF A HANGOVER: NO
HOW OFTEN DO YOU HAVE A DRINK CONTAINING ALCOHOL: NEVER
AUDIT-C TOTAL SCORE: 0
AUDIT-C TOTAL SCORE: 0
HOW OFTEN DO YOU HAVE 6 OR MORE DRINKS ON ONE OCCASION: NEVER
HOW MANY STANDARD DRINKS CONTAINING ALCOHOL DO YOU HAVE ON A TYPICAL DAY: PATIENT DOES NOT DRINK
TOTAL SCORE: 0
SKIP TO QUESTIONS 9-10: 1
HAVE YOU EVER FELT YOU SHOULD CUT DOWN ON YOUR DRINKING: NO
EVER FELT BAD OR GUILTY ABOUT YOUR DRINKING: NO
HAVE PEOPLE ANNOYED YOU BY CRITICIZING YOUR DRINKING: NO

## 2025-05-02 ASSESSMENT — PAIN DESCRIPTION - DESCRIPTORS: DESCRIPTORS: ACHING

## 2025-05-02 ASSESSMENT — PAIN - FUNCTIONAL ASSESSMENT: PAIN_FUNCTIONAL_ASSESSMENT: 0-10

## 2025-05-02 ASSESSMENT — PAIN DESCRIPTION - PAIN TYPE: TYPE: ACUTE PAIN

## 2025-05-02 NOTE — ED TRIAGE NOTES
Pt presents with neck and back pain, she states that her pain fluctuates between 4 and 9 pt is 19 weeks OB her due date is 2025

## 2025-05-03 VITALS
TEMPERATURE: 97.2 F | RESPIRATION RATE: 17 BRPM | OXYGEN SATURATION: 96 % | WEIGHT: 144.18 LBS | HEIGHT: 67 IN | SYSTOLIC BLOOD PRESSURE: 106 MMHG | HEART RATE: 75 BPM | BODY MASS INDEX: 22.63 KG/M2 | DIASTOLIC BLOOD PRESSURE: 56 MMHG

## 2025-05-03 PROCEDURE — 2500000004 HC RX 250 GENERAL PHARMACY W/ HCPCS (ALT 636 FOR OP/ED): Mod: SE | Performed by: STUDENT IN AN ORGANIZED HEALTH CARE EDUCATION/TRAINING PROGRAM

## 2025-05-03 PROCEDURE — 99215 OFFICE O/P EST HI 40 MIN: CPT

## 2025-05-03 PROCEDURE — 2500000001 HC RX 250 WO HCPCS SELF ADMINISTERED DRUGS (ALT 637 FOR MEDICARE OP): Mod: SE | Performed by: STUDENT IN AN ORGANIZED HEALTH CARE EDUCATION/TRAINING PROGRAM

## 2025-05-03 RX ORDER — PREDNISONE 20 MG/1
40 TABLET ORAL ONCE
Status: COMPLETED | OUTPATIENT
Start: 2025-05-03 | End: 2025-05-03

## 2025-05-03 RX ORDER — PREDNISONE 20 MG/1
TABLET ORAL
Qty: 8 TABLET | Refills: 0 | Status: SHIPPED | OUTPATIENT
Start: 2025-05-03

## 2025-05-03 RX ORDER — CLONAZEPAM 0.5 MG/1
1 TABLET ORAL ONCE
Status: COMPLETED | OUTPATIENT
Start: 2025-05-03 | End: 2025-05-03

## 2025-05-03 RX ORDER — CYCLOBENZAPRINE HCL 10 MG
10 TABLET ORAL ONCE
Status: COMPLETED | OUTPATIENT
Start: 2025-05-03 | End: 2025-05-03

## 2025-05-03 RX ADMIN — CLONAZEPAM 1 MG: 0.5 TABLET ORAL at 02:24

## 2025-05-03 RX ADMIN — PREDNISONE 40 MG: 20 TABLET ORAL at 02:24

## 2025-05-03 RX ADMIN — CYCLOBENZAPRINE 10 MG: 10 TABLET, FILM COATED ORAL at 02:24

## 2025-05-03 ASSESSMENT — PAIN SCALES - GENERAL
PAINLEVEL_OUTOF10: 10 - WORST POSSIBLE PAIN
PAINLEVEL_OUTOF10: 4
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
## 2025-05-03 NOTE — H&P
OB Triage H&P    Assessment/Plan    Trinh Fiore is a 34 y.o.  at 19w2d, CATHERINE: 2025, by Last Menstrual Period, who presents to triage with neck pain.    Neck Pain  -pt with history of cervical disc herniation, reporting radicular symptoms in bilateral arms with severe neck pain, no trauma  -neurology consulted per pt request, no further imaging required at this time  -pt reports pain relief with PO prednisone but has reported tachycardia reaction with prednisone previously  -40mg prednisone PO administered in triage without adverse reaction  -pt discharged with prednisone taper (40mg daily for 3 days, 20mg daily for 2 days, 10mg daily for 2 days)  -neurology to arrange for outpatient follow up    Pregnancy notable for:  -heterozygous for factor V Leiden: on lovenox 40mg BID  -anxiety: on Wellbutrin and Klonopin prn  -h/o shoulder dystocia  -h/o OUD: on Subutex     Plan   -Good fetal movement  -Up to date on prenatal care  -Continue routine prenatal care    Dispo  -Patient appropriate for discharge home, agrees with plan  -Return precautions discussed   -Follow up at next scheduled OB appointment or to triage sooner as needed    Discussed plan and reviewed with: Dr. Vadim Thornton MD, PGY-1      Pregnancy Problems (from 27 to present)       Problem Noted Diagnosed Resolved    Anxiety during pregnancy in first trimester, antepartum 3/13/2025 by Peace Fierro MD  No    Priority:  Medium       Overview Signed 3/13/2025  5:30 AM by Peace Fierro MD   - increased symptomatology in the last year  - experiences panic attacks, also with increasing frequency recently  - current regimen Wellbutrin 300 mg daily, clonazepam 2 mg twice daily PRN (endorses consistent twice daily use, wants to wean), and Ativan 2 mg twice daily PRN (endorses consistent twice daily use, wants to wean)  - interested in referral to Dr. Hare, will place referral         Patent foramen ovale ([facility]) 3/11/2025 by  Peace Fierro MD  No    Priority:  Medium       Substance dependence during pregnancy, antepartum (Multi) 3/11/2025 by Peace Fierro MD  No    Priority:  Medium       Overview Addendum 3/13/2025  5:21 AM by Peace Fierro MD   - in recovery  - sober of opiates since August 2018  - continues on maintenance therapy with Subutex 8 mg recently uptitrated to four times daily  - interested in referral to [facility] clinic, will refer for shared care           History of cholestasis during pregnancy 3/11/2025 by Peace Fierro MD  No    Priority:  Medium       Overview Signed 3/13/2025  5:32 AM by Peace Fierro MD   - experiencing generalized pruritus, discussed highly unlikely to be related to cholestasis, CMP and bile acids obtained for reassurance         Factor V Leiden mutation affecting pregnancy (Multi) 2/13/2025 by Wilfrido Méndez, DO  No    Priority:  Medium       Overview Addendum 3/13/2025  5:26 AM by Peace Fierro MD   - heterozygous  - no personal history of VTE, only superficial phlebitis, but was treated with anticoagulation given superficial clot in context of PFO  - mom with TIA leading to diagnosis of factor V, had episodes of VTE as well  - per ACOG guidance as well as MFM consultation in 2022 discussed recommendation for prophylactic dosing of Lovenox with 40 mg daily         H/O shoulder dystocia in prior pregnancy, currently pregnant ([facility]) 2/13/2025 by Wilfrido Méndez, DO  No    Priority:  Medium       Prior pregnancy with placenta abruption in first trimester, antepartum ([facility]) 2/13/2025 by Wilfrido Méndez, DO  No    Priority:  Medium       11 weeks gestation of pregnancy ([facility]) 2/13/2025 by Wilfrido Méndez, DO  No    Priority:  Medium       Overview Addendum 3/13/2025  5:24 AM by Peace Fierro MD   Desired provider in labor: [] CNM  [x] Physician   [] Either Acceptable  [] Blood Products: [] Yes, accepts [] No, needs counseling  [x] Initial BMI: 20.40   [x] Prenatal Labs: non-immune to rubella and Hep  B  [x] Cervical Cancer Screening: negative cytology 2025  [x] Rh status: positive  [] Screen for IPV and Substance Use Risk:   [x] Genetic Screening (cfDNA): ordered, aware she will need to  a kit and paper requisition/face sheet from office  [x] First Trimester Anatomy Screen (11-13.6 wks): 3/5, wnl  [x] Baby ASA (initiated): taking  [x] Pregnancy dated by: LMP c/w 11 wk US    [] Anatomy US: (19-20 wks)  [] Federal Sterilization consent signed (if indicated):  [] 1hr GCT at 24-28wks:  [] Fetal Surveillance (if indicated):  [] Tdap (27-32 wks, may be given up to 36 wks if initial window missed):   [] RSV (32-36 wks) (Sept. 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 Expectant management:  [] Mode of delivery ( anticipated ):          History of  delivery, currently pregnant in first trimester ([facility]) 3/11/2025 by Peace Fierro MD  3/11/2025 by Peace Fierro MD    Priority:  Medium       Overview Signed 3/11/2025 10:42 AM by Peace Fierro MD   - 36 wga                 Subjective   Good fetal movement.  Denies vaginal bleeding., Denies contractions., Denies leaking of fluid.      Patient reports severe neck pain for about 1 week that causes numbness/tingling down bilateral arms. She has not been able to move her neck without pain and has been unable to perform daily activities. Denies any trauma but reports doing a lot of housework that triggered this episode. It is also associated with a headache, denies any blurry vision.     She says she first started having neck problems in 2019. She had XR and CT at the time that showed multilevel disc protrusions within cervical spine most significant at C4-C5. She was supposed to get MRI but did not follow up with NSGY. She has had previous episodes of this in the past that have resolved with a course of prednisone  but she reports an adverse reaction to prednisone with severe tachycardia to 160s.     Prenatal Provider: Vadim    OB History    Para Term  AB Living   8 3 2 1 4 3   SAB IAB Ectopic Multiple Live Births   4 0 0 0 3      # Outcome Date GA Lbr Danny/2nd Weight Sex Type Anes PTL Lv   8 Current            7 Term 23 37w0d  3.402 kg M Vag-Spont EPI  DEWAYNE      Complications: Shoulder Dystocia   6  20 36w0d  3.005 kg M Vag-Spont EPI N DEWAYNE   5 Term 13 37w0d  3.742 kg M Vag-Spont EPI N DEWAYNE      Complications: Placenta abruptio ([facility])   4 SAB            3 SAB            2 SAB            1 SAB                Surgical History[1]    Social History     Tobacco Use    Smoking status: Former     Current packs/day: 0.00     Average packs/day: 1 pack/day for 10.8 years (10.8 ttl pk-yrs)     Types: Cigarettes     Start date: 2007     Quit date: 10/7/2017     Years since quittin.5    Smokeless tobacco: Never    Tobacco comments:     Vape trying to quit [facility] nicotine pouch   Substance Use Topics    Alcohol use: Never       Allergies[2]    Prescriptions Prior to Admission[3]  Objective     Last Vitals  Temp Pulse Resp BP MAP O2 Sat   36.6 °C (97.9 °F) 84 17 126/76 92 100 %     Blood Pressures         2025  1939 2025          BP: 112/64 126/76               Physical Exam  General: NAD, mood appropriate  Cardiopulmonary: warm and well perfused, breathing comfortably on room air  Abdomen: Gravid, non-tender  Extremities: Symmetric  Speculum Exam: deferred  Cervix: deferred    Fetal Monitoring  FHT: 150s           [1]   Past Surgical History:  Procedure Laterality Date    MR HEAD ANGIO WO IV CONTRAST  2017    MR HEAD ANGIO WO IV CONTRAST 2017 [facility] CLINICAL LEGACY    MR NECK ANGIO WO IV CONTRAST  2017    MR NECK ANGIO WO IV CONTRAST 2017 [facility] CLINICAL LEGACY    OTHER SURGICAL HISTORY  2017    Biopsy Bone Marrow    OTHER SURGICAL HISTORY  2022     Colonoscopy    OTHER SURGICAL HISTORY  2022    Dilation and curettage    OTHER SURGICAL HISTORY  2022    Laparoscopy    OTHER SURGICAL HISTORY  2022    Nasal septoplasty   [2]   Allergies  Allergen Reactions    Prednisone Cardiac arrhythmia/arrest, Palpitations and Shortness of breath    Cefdinir Diarrhea, Dizziness and GI Upset    Naltrexone Confusion and Psychosis     Amnesia after receiving Vivitrol injection in 2018   [3]   Medications Prior to Admission   Medication Sig Dispense Refill Last Dose/Taking    buprenorphine (Subtex) 8 mg Place 1 tablet (8 mg) under the tongue 4 times a day. 120 tablet 2 2025    buPROPion XL (Wellbutrin XL) 150 mg 24 hr tablet Take 1 tablet (150 mg) by mouth once daily in the morning. 30 tablet 11 2025    clonazePAM (KlonoPIN) 2 mg tablet Take 1 tablet (2 mg) by mouth 3 times a day. May also take 0.5 tablets (1 mg) as needed at bedtime for anxiety. 105 tablet 0 2025    aspirin 81 mg chewable tablet Chew 1 tablet (81 mg) once daily.       enoxaparin (Lovenox) 40 mg/0.4 mL syringe Inject 0.4 mL (40 mg) under the skin once daily. 90 each 3     [] fluconazole (Diflucan) 150 mg tablet Take 1 tablet (150 mg) by mouth 1 time for 1 dose. Repeat in 72 hours if symptoms persist. 2 tablet 0

## 2025-05-06 ENCOUNTER — APPOINTMENT (OUTPATIENT)
Dept: RADIOLOGY | Facility: HOSPITAL | Age: 35
End: 2025-05-06
Payer: COMMERCIAL

## 2025-05-09 ENCOUNTER — APPOINTMENT (OUTPATIENT)
Dept: OBSTETRICS AND GYNECOLOGY | Facility: CLINIC | Age: 35
End: 2025-05-09
Payer: COMMERCIAL

## 2025-05-09 ENCOUNTER — TELEMEDICINE (OUTPATIENT)
Dept: BEHAVIORAL HEALTH | Facility: CLINIC | Age: 35
End: 2025-05-09
Payer: COMMERCIAL

## 2025-05-09 VITALS — BODY MASS INDEX: 22.87 KG/M2 | DIASTOLIC BLOOD PRESSURE: 66 MMHG | WEIGHT: 146 LBS | SYSTOLIC BLOOD PRESSURE: 100 MMHG

## 2025-05-09 DIAGNOSIS — O09.92 SUPERVISION OF HIGH RISK PREGNANCY IN SECOND TRIMESTER (HHS-HCC): Primary | ICD-10-CM

## 2025-05-09 DIAGNOSIS — O99.119 FACTOR V LEIDEN MUTATION AFFECTING PREGNANCY (MULTI): ICD-10-CM

## 2025-05-09 DIAGNOSIS — F11.20: ICD-10-CM

## 2025-05-09 DIAGNOSIS — D68.51 FACTOR V LEIDEN MUTATION AFFECTING PREGNANCY (MULTI): ICD-10-CM

## 2025-05-09 DIAGNOSIS — F41.1 GAD (GENERALIZED ANXIETY DISORDER): ICD-10-CM

## 2025-05-09 DIAGNOSIS — O99.342 DEPRESSION AFFECTING PREGNANCY IN SECOND TRIMESTER, ANTEPARTUM (HHS-HCC): ICD-10-CM

## 2025-05-09 DIAGNOSIS — F32.A DEPRESSION AFFECTING PREGNANCY IN SECOND TRIMESTER, ANTEPARTUM (HHS-HCC): ICD-10-CM

## 2025-05-09 PROBLEM — O99.320: Status: RESOLVED | Noted: 2025-03-11 | Resolved: 2025-05-09

## 2025-05-09 PROBLEM — M54.12 CERVICAL RADICULOPATHY: Status: ACTIVE | Noted: 2025-05-04

## 2025-05-09 PROBLEM — F19.20: Status: RESOLVED | Noted: 2025-03-11 | Resolved: 2025-05-09

## 2025-05-09 PROBLEM — Z3A.20 20 WEEKS GESTATION OF PREGNANCY (HHS-HCC): Status: ACTIVE | Noted: 2025-02-13

## 2025-05-09 PROBLEM — O09.291: Status: RESOLVED | Noted: 2025-02-13 | Resolved: 2025-05-09

## 2025-05-09 PROCEDURE — 99214 OFFICE O/P EST MOD 30 MIN: CPT | Performed by: STUDENT IN AN ORGANIZED HEALTH CARE EDUCATION/TRAINING PROGRAM

## 2025-05-09 RX ORDER — BUPRENORPHINE HYDROCHLORIDE 8 MG/1
8 TABLET SUBLINGUAL 4 TIMES DAILY
Qty: 120 TABLET | Refills: 2 | Status: SHIPPED | OUTPATIENT
Start: 2025-05-09 | End: 2025-08-07

## 2025-05-09 RX ORDER — NAPROXEN SODIUM 220 MG/1
81 TABLET, FILM COATED ORAL DAILY
Qty: 30 TABLET | Refills: 11 | Status: SHIPPED | OUTPATIENT
Start: 2025-05-09 | End: 2026-05-09

## 2025-05-09 RX ORDER — BUPROPION HYDROCHLORIDE 150 MG/1
150 TABLET ORAL EVERY MORNING
Qty: 30 TABLET | Refills: 11 | Status: SHIPPED | OUTPATIENT
Start: 2025-05-09 | End: 2026-05-09

## 2025-05-09 RX ORDER — ENOXAPARIN SODIUM 100 MG/ML
40 INJECTION SUBCUTANEOUS DAILY
Qty: 90 EACH | Refills: 3 | Status: SHIPPED | OUTPATIENT
Start: 2025-05-09 | End: 2026-05-09

## 2025-05-09 RX ORDER — CLONAZEPAM 2 MG/1
TABLET ORAL
Qty: 105 TABLET | Refills: 2 | Status: SHIPPED | OUTPATIENT
Start: 2025-05-09 | End: 2025-08-07
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
## 2025-05-09 NOTE — PROGRESS NOTES
Ob Visit  25     SUBJECTIVE      HPI: Trinh Fioer is a 34 y.o.  at 20w2d here for RPNV.  She has no contractions, no bleeding, and no LOF. Reports normal fetal movement.  Patient reports improvement in neck pain after admission to [facility] where she was treated with high dose steroids and imaging demonstrated mild degenerative changes of the cervical spine with small disc protrusion at C4-C5 without significant central canal or neural foraminal stenosis. She was referred for PT.       OBJECTIVE  Visit Vitals  /66   Wt 66.2 kg (146 lb)   LMP 2024   BMI 22.87 kg/m²   OB Status Pregnant   Smoking Status Former   BSA 1.77 m²            ASSESSMENT & PLAN    Trinh Fiore is a 34 y.o.  at 20w2d here for the following concerns we addressed today:    Supervision of high risk pregnancy in second trimester (HHS-HCC)  - anatomy scan pending  - care otherwise complete to date        RTC in 4 weeks      Peace Fierro MD

## 2025-05-13 ENCOUNTER — HOSPITAL ENCOUNTER (OUTPATIENT)
Dept: RADIOLOGY | Facility: HOSPITAL | Age: 35
Discharge: HOME | End: 2025-05-13
Payer: COMMERCIAL

## 2025-05-13 DIAGNOSIS — O35.9XX0 MATERNAL CARE FOR (SUSPECTED) FETAL ABNORMALITY AND DAMAGE, UNSPECIFIED, NOT APPLICABLE OR UNSPECIFIED: ICD-10-CM

## 2025-05-13 DIAGNOSIS — O99.119 FACTOR V LEIDEN MUTATION AFFECTING PREGNANCY (MULTI): ICD-10-CM

## 2025-05-13 DIAGNOSIS — D68.51 FACTOR V LEIDEN MUTATION AFFECTING PREGNANCY (MULTI): ICD-10-CM

## 2025-05-13 DIAGNOSIS — O09.891 MEDICATION EXPOSURE DURING FIRST TRIMESTER OF PREGNANCY (HHS-HCC): ICD-10-CM

## 2025-05-13 PROCEDURE — 76811 OB US DETAILED SNGL FETUS: CPT | Performed by: OBSTETRICS & GYNECOLOGY

## 2025-05-13 PROCEDURE — 76811 OB US DETAILED SNGL FETUS: CPT

## 2025-05-15 ENCOUNTER — SOCIAL WORK (OUTPATIENT)
Dept: OBSTETRICS AND GYNECOLOGY | Facility: CLINIC | Age: 35
End: 2025-05-15
Payer: COMMERCIAL

## 2025-05-15 ENCOUNTER — TELEPHONE (OUTPATIENT)
Dept: BEHAVIORAL HEALTH | Facility: CLINIC | Age: 35
End: 2025-05-15

## 2025-05-28 ENCOUNTER — APPOINTMENT (OUTPATIENT)
Dept: BEHAVIORAL HEALTH | Facility: CLINIC | Age: 35
End: 2025-05-28
Payer: COMMERCIAL

## 2025-06-04 ENCOUNTER — APPOINTMENT (OUTPATIENT)
Dept: BEHAVIORAL HEALTH | Facility: CLINIC | Age: 35
End: 2025-06-04
Payer: COMMERCIAL

## 2025-06-05 ENCOUNTER — APPOINTMENT (OUTPATIENT)
Dept: BEHAVIORAL HEALTH | Facility: CLINIC | Age: 35
End: 2025-06-05
Payer: COMMERCIAL

## 2025-06-05 ENCOUNTER — TELEMEDICINE (OUTPATIENT)
Dept: BEHAVIORAL HEALTH | Facility: CLINIC | Age: 35
End: 2025-06-05
Payer: COMMERCIAL

## 2025-06-05 DIAGNOSIS — F41.9 ANXIETY DURING PREGNANCY IN FIRST TRIMESTER, ANTEPARTUM: Primary | ICD-10-CM

## 2025-06-05 DIAGNOSIS — O99.341 ANXIETY DURING PREGNANCY IN FIRST TRIMESTER, ANTEPARTUM: Primary | ICD-10-CM

## 2025-06-05 RX ORDER — PROPRANOLOL HYDROCHLORIDE 10 MG/1
10 TABLET ORAL 2 TIMES DAILY PRN
Qty: 60 TABLET | Refills: 2 | Status: SHIPPED | OUTPATIENT
Start: 2025-06-05 | End: 2025-09-03

## 2025-06-05 RX ORDER — DIAZEPAM 10 MG/1
20 TABLET ORAL EVERY 8 HOURS
Qty: 90 TABLET | Refills: 0 | Status: SHIPPED | OUTPATIENT
Start: 2025-06-15 | End: 2025-06-30

## 2025-06-05 RX ORDER — NALOXONE HYDROCHLORIDE 4 MG/.1ML
1 SPRAY NASAL AS NEEDED
Qty: 2 EACH | Refills: 0 | Status: SHIPPED | OUTPATIENT
Start: 2025-06-05
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
## 2025-06-11 ENCOUNTER — APPOINTMENT (OUTPATIENT)
Dept: BEHAVIORAL HEALTH | Facility: CLINIC | Age: 35
End: 2025-06-11
Payer: COMMERCIAL

## 2025-06-11 DIAGNOSIS — F41.1 GAD (GENERALIZED ANXIETY DISORDER): ICD-10-CM

## 2025-06-11 DIAGNOSIS — F32.A DEPRESSION AFFECTING PREGNANCY IN SECOND TRIMESTER, ANTEPARTUM (HHS-HCC): ICD-10-CM

## 2025-06-11 DIAGNOSIS — F11.20: ICD-10-CM

## 2025-06-11 DIAGNOSIS — O99.342 DEPRESSION AFFECTING PREGNANCY IN SECOND TRIMESTER, ANTEPARTUM (HHS-HCC): ICD-10-CM

## 2025-06-11 DIAGNOSIS — F41.9 ANXIETY DURING PREGNANCY IN FIRST TRIMESTER, ANTEPARTUM: ICD-10-CM

## 2025-06-11 DIAGNOSIS — O99.341 ANXIETY DURING PREGNANCY IN FIRST TRIMESTER, ANTEPARTUM: ICD-10-CM

## 2025-06-11 PROCEDURE — 99214 OFFICE O/P EST MOD 30 MIN: CPT | Performed by: STUDENT IN AN ORGANIZED HEALTH CARE EDUCATION/TRAINING PROGRAM

## 2025-06-11 RX ORDER — DIAZEPAM 10 MG/1
20 TABLET ORAL EVERY 12 HOURS PRN
Qty: 60 TABLET | Refills: 1 | Status: SHIPPED | OUTPATIENT
Start: 2025-06-11 | End: 2025-07-11
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
## 2025-06-17 ENCOUNTER — APPOINTMENT (OUTPATIENT)
Dept: OBSTETRICS AND GYNECOLOGY | Facility: HOSPITAL | Age: 35
End: 2025-06-17
Payer: COMMERCIAL

## 2025-06-17 ENCOUNTER — ROUTINE PRENATAL (OUTPATIENT)
Dept: OBSTETRICS AND GYNECOLOGY | Facility: HOSPITAL | Age: 35
End: 2025-06-17
Payer: COMMERCIAL

## 2025-06-17 VITALS — BODY MASS INDEX: 23.49 KG/M2 | SYSTOLIC BLOOD PRESSURE: 106 MMHG | WEIGHT: 150 LBS | DIASTOLIC BLOOD PRESSURE: 67 MMHG

## 2025-06-17 DIAGNOSIS — D68.51 FACTOR V LEIDEN MUTATION AFFECTING PREGNANCY (MULTI): ICD-10-CM

## 2025-06-17 DIAGNOSIS — O09.299 H/O SHOULDER DYSTOCIA IN PRIOR PREGNANCY, CURRENTLY PREGNANT (HHS-HCC): ICD-10-CM

## 2025-06-17 DIAGNOSIS — O99.119 FACTOR V LEIDEN MUTATION AFFECTING PREGNANCY (MULTI): ICD-10-CM

## 2025-06-17 DIAGNOSIS — Z3A.25 25 WEEKS GESTATION OF PREGNANCY (HHS-HCC): ICD-10-CM

## 2025-06-17 DIAGNOSIS — O09.92 SUPERVISION OF HIGH RISK PREGNANCY IN SECOND TRIMESTER (HHS-HCC): Primary | ICD-10-CM

## 2025-06-17 PROBLEM — F11.90 OPIOID USE DISORDER: Status: ACTIVE | Noted: 2025-06-17

## 2025-06-17 PROCEDURE — 99213 OFFICE O/P EST LOW 20 MIN: CPT | Performed by: OBSTETRICS & GYNECOLOGY

## 2025-06-17 PROCEDURE — 99213 OFFICE O/P EST LOW 20 MIN: CPT | Mod: TH | Performed by: OBSTETRICS & GYNECOLOGY

## 2025-06-17 ASSESSMENT — EDINBURGH POSTNATAL DEPRESSION SCALE (EPDS)
I HAVE LOOKED FORWARD WITH ENJOYMENT TO THINGS: AS MUCH AS I EVER DID
I HAVE BEEN ANXIOUS OR WORRIED FOR NO GOOD REASON: HARDLY EVER
THINGS HAVE BEEN GETTING ON TOP OF ME: YES, SOMETIMES I HAVEN'T BEEN COPING AS WELL AS USUAL
THE THOUGHT OF HARMING MYSELF HAS OCCURRED TO ME: NEVER
TOTAL SCORE: 5
I HAVE BEEN ABLE TO LAUGH AND SEE THE FUNNY SIDE OF THINGS: AS MUCH AS I ALWAYS COULD
I HAVE FELT SAD OR MISERABLE: NO, NOT AT ALL
I HAVE FELT SCARED OR PANICKY FOR NO GOOD REASON: NO, NOT MUCH
I HAVE BLAMED MYSELF UNNECESSARILY WHEN THINGS WENT WRONG: NOT VERY OFTEN
I HAVE BEEN SO UNHAPPY THAT I HAVE HAD DIFFICULTY SLEEPING: NOT AT ALL
I HAVE BEEN SO UNHAPPY THAT I HAVE BEEN CRYING: NO, NEVER

## 2025-06-17 NOTE — PROGRESS NOTES
Ob Visit  25     SUBJECTIVE    HPI: Trinh Fiore is a 34 y.o.  at 25w6d here for RPNV.  She has no contractions, no bleeding, or no LOF. Reports normal fetal movement. Patient reports fatigue.      OBJECTIVE  Visit Vitals  /67   Wt 68 kg (150 lb)   LMP 2024   BMI 23.49 kg/m²   OB Status Pregnant   Smoking Status Former   BSA 1.79 m²      ASSESSMENT & PLAN    Trinh Fiore is a 34 y.o.  at 25w6d here for the following concerns we addressed today:    Problem List Items Addressed This Visit       Factor V Leiden mutation affecting pregnancy (Multi)    Overview   - heterozygous  - no personal history of VTE, only superficial phlebitis, but was treated with anticoagulation given superficial clot in context of PFO  - mom with TIA leading to diagnosis of factor V, had episodes of VTE as well  - per ACOG guidance as well as MFM consultation in  discussed recommendation for prophylactic dosing of Lovenox with 40 mg daily         H/O shoulder dystocia in prior pregnancy, currently pregnant ([facility])    25 weeks gestation of pregnancy ([facility])    Overview   Desired provider in labor: [] CNM  [x] Physician   [] Either Acceptable  [x] Blood Products: [x] Yes, accepts [] No, needs counseling  [x] Initial BMI: 20.40   [x] Prenatal Labs: non-immune to rubella and Hep B  [x] Cervical Cancer Screening: negative cytology 2025  [x] Rh status: positive  [x] Screen for IPV and Substance Use Risk: OUD- on Buprenorphine 8 mg QID  [x] Genetic Screening (cfDNA): WNL  [x] First Trimester Anatomy Screen (11-13.6 wks): 3/5, wnl  [x] Baby ASA (initiated): taking  [x] Pregnancy dated by: LMP c/w 11 wk US    [x] Anatomy US: (19-20 wks) WNL 25  [] Federal Sterilization consent signed (if indicated):  [] 1hr GCT at 24-28wks:  [] Fetal Surveillance (if indicated): 30, 36 week growth  [] Tdap (27-32 wks, may be given up to 36 wks if initial window missed):   [] RSV (32-36 wks) (Sept. 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 Expectant management:  [] Mode of delivery ( anticipated ):          Supervision of high risk pregnancy in second trimester (HHS-HCC) - Primary    Overview   - OUD in remission, on subutex  - factor V heterozygous  - anxiety/depression  - history of cholestasis  - PFO  - history of should dystocia         Relevant Orders    CBC Anemia Panel With Reflex,Pregnancy    Glucose, 1 Hour Screen, Pregnancy    Syphilis Screen with Reflex       RTC in 4 weeks      Coreen May MD

## 2025-06-24 ENCOUNTER — APPOINTMENT (OUTPATIENT)
Dept: HEMATOLOGY/ONCOLOGY | Facility: CLINIC | Age: 35
End: 2025-06-24
Payer: COMMERCIAL

## 2025-06-25 ENCOUNTER — TELEMEDICINE (OUTPATIENT)
Dept: BEHAVIORAL HEALTH | Facility: CLINIC | Age: 35
End: 2025-06-25
Payer: COMMERCIAL

## 2025-06-25 ENCOUNTER — TELEPHONE (OUTPATIENT)
Dept: OBSTETRICS AND GYNECOLOGY | Facility: HOSPITAL | Age: 35
End: 2025-06-25
Payer: COMMERCIAL

## 2025-06-25 DIAGNOSIS — F32.A DEPRESSION AFFECTING PREGNANCY IN SECOND TRIMESTER, ANTEPARTUM (HHS-HCC): ICD-10-CM

## 2025-06-25 DIAGNOSIS — F41.1 GAD (GENERALIZED ANXIETY DISORDER): Primary | ICD-10-CM

## 2025-06-25 DIAGNOSIS — F11.20: ICD-10-CM

## 2025-06-25 DIAGNOSIS — O99.342 DEPRESSION AFFECTING PREGNANCY IN SECOND TRIMESTER, ANTEPARTUM (HHS-HCC): ICD-10-CM

## 2025-06-25 PROCEDURE — 99214 OFFICE O/P EST MOD 30 MIN: CPT | Performed by: STUDENT IN AN ORGANIZED HEALTH CARE EDUCATION/TRAINING PROGRAM

## 2025-06-25 PROCEDURE — 1036F TOBACCO NON-USER: CPT | Performed by: STUDENT IN AN ORGANIZED HEALTH CARE EDUCATION/TRAINING PROGRAM

## 2025-06-25 RX ORDER — QUETIAPINE FUMARATE 25 MG/1
12.5 TABLET, FILM COATED ORAL NIGHTLY
Qty: 15 TABLET | Refills: 1 | Status: SHIPPED | OUTPATIENT
Start: 2025-06-25 | End: 2025-08-24

## 2025-06-25 NOTE — TELEPHONE ENCOUNTER
Nurse called pharmacy to see what could be done about the patient being able to get her prescription for Diazepam filled one day early so that she would have her a.m dose. While on the phone with the pharmacist, Dr. Hare called the pharmacy and okayed the refill a day early.
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
## 2025-06-26 ENCOUNTER — APPOINTMENT (OUTPATIENT)
Dept: RADIOLOGY | Facility: HOSPITAL | Age: 35
End: 2025-06-26
Payer: COMMERCIAL

## 2025-06-26 ENCOUNTER — HOSPITAL ENCOUNTER (OUTPATIENT)
Facility: HOSPITAL | Age: 35
End: 2025-06-26
Attending: OBSTETRICS & GYNECOLOGY | Admitting: OBSTETRICS & GYNECOLOGY
Payer: COMMERCIAL

## 2025-06-26 ENCOUNTER — HOSPITAL ENCOUNTER (OUTPATIENT)
Facility: HOSPITAL | Age: 35
Discharge: HOME | End: 2025-06-26
Attending: OBSTETRICS & GYNECOLOGY | Admitting: OBSTETRICS & GYNECOLOGY
Payer: COMMERCIAL

## 2025-06-26 VITALS
TEMPERATURE: 97 F | RESPIRATION RATE: 18 BRPM | HEART RATE: 84 BPM | DIASTOLIC BLOOD PRESSURE: 58 MMHG | WEIGHT: 154.1 LBS | BODY MASS INDEX: 24.19 KG/M2 | HEIGHT: 67 IN | OXYGEN SATURATION: 98 % | SYSTOLIC BLOOD PRESSURE: 125 MMHG

## 2025-06-26 DIAGNOSIS — O99.119 FACTOR V LEIDEN MUTATION AFFECTING PREGNANCY (MULTI): Primary | ICD-10-CM

## 2025-06-26 DIAGNOSIS — D68.51 FACTOR V LEIDEN MUTATION AFFECTING PREGNANCY (MULTI): Primary | ICD-10-CM

## 2025-06-26 DIAGNOSIS — O12.03 SWELLING OF LOWER EXTREMITY DURING PREGNANCY IN THIRD TRIMESTER: ICD-10-CM

## 2025-06-26 LAB
ANION GAP SERPL CALC-SCNC: 10 MMOL/L (ref 10–20)
BUN SERPL-MCNC: 14 MG/DL (ref 6–23)
CALCIUM SERPL-MCNC: 8.5 MG/DL (ref 8.6–10.6)
CHLORIDE SERPL-SCNC: 102 MMOL/L (ref 98–107)
CO2 SERPL-SCNC: 27 MMOL/L (ref 21–32)
CREAT SERPL-MCNC: 0.51 MG/DL (ref 0.5–1.05)
EGFRCR SERPLBLD CKD-EPI 2021: >90 ML/MIN/1.73M*2
GLUCOSE SERPL-MCNC: 98 MG/DL (ref 74–99)
POTASSIUM SERPL-SCNC: 3.9 MMOL/L (ref 3.5–5.3)
SODIUM SERPL-SCNC: 135 MMOL/L (ref 136–145)

## 2025-06-26 PROCEDURE — 2550000001 HC RX 255 CONTRASTS: Mod: SE | Performed by: OBSTETRICS & GYNECOLOGY

## 2025-06-26 PROCEDURE — 99214 OFFICE O/P EST MOD 30 MIN: CPT | Mod: 25

## 2025-06-26 PROCEDURE — 71275 CT ANGIOGRAPHY CHEST: CPT | Performed by: STUDENT IN AN ORGANIZED HEALTH CARE EDUCATION/TRAINING PROGRAM

## 2025-06-26 PROCEDURE — 2500000002 HC RX 250 W HCPCS SELF ADMINISTERED DRUGS (ALT 637 FOR MEDICARE OP, ALT 636 FOR OP/ED): Mod: SE

## 2025-06-26 PROCEDURE — 80048 BASIC METABOLIC PNL TOTAL CA: CPT

## 2025-06-26 PROCEDURE — 99213 OFFICE O/P EST LOW 20 MIN: CPT

## 2025-06-26 PROCEDURE — 87491 CHLMYD TRACH DNA AMP PROBE: CPT

## 2025-06-26 PROCEDURE — 36415 COLL VENOUS BLD VENIPUNCTURE: CPT

## 2025-06-26 PROCEDURE — 4500999001 HC ED NO CHARGE

## 2025-06-26 PROCEDURE — 2500000001 HC RX 250 WO HCPCS SELF ADMINISTERED DRUGS (ALT 637 FOR MEDICARE OP): Mod: SE

## 2025-06-26 PROCEDURE — 71275 CT ANGIOGRAPHY CHEST: CPT

## 2025-06-26 RX ORDER — HYDRALAZINE HYDROCHLORIDE 20 MG/ML
5 INJECTION INTRAMUSCULAR; INTRAVENOUS ONCE AS NEEDED
Status: CANCELLED | OUTPATIENT
Start: 2025-06-26

## 2025-06-26 RX ORDER — NAPROXEN SODIUM 220 MG/1
81 TABLET, FILM COATED ORAL ONCE
Status: COMPLETED | OUTPATIENT
Start: 2025-06-26 | End: 2025-06-26

## 2025-06-26 RX ORDER — DIAZEPAM 5 MG/1
20 TABLET ORAL EVERY 12 HOURS PRN
Status: DISCONTINUED | OUTPATIENT
Start: 2025-06-26 | End: 2025-06-27 | Stop reason: HOSPADM

## 2025-06-26 RX ORDER — HYDRALAZINE HYDROCHLORIDE 20 MG/ML
5 INJECTION INTRAMUSCULAR; INTRAVENOUS ONCE AS NEEDED
Status: DISCONTINUED | OUTPATIENT
Start: 2025-06-26 | End: 2025-06-27 | Stop reason: HOSPADM

## 2025-06-26 RX ORDER — BUPROPION HYDROCHLORIDE 150 MG/1
150 TABLET ORAL ONCE
Status: COMPLETED | OUTPATIENT
Start: 2025-06-26 | End: 2025-06-26

## 2025-06-26 RX ORDER — ONDANSETRON HYDROCHLORIDE 2 MG/ML
4 INJECTION, SOLUTION INTRAVENOUS EVERY 6 HOURS PRN
Status: CANCELLED | OUTPATIENT
Start: 2025-06-26

## 2025-06-26 RX ORDER — BUPRENORPHINE HYDROCHLORIDE 8 MG/1
8 TABLET SUBLINGUAL ONCE
Status: DISCONTINUED | OUTPATIENT
Start: 2025-06-26 | End: 2025-06-26

## 2025-06-26 RX ORDER — ONDANSETRON 4 MG/1
4 TABLET, FILM COATED ORAL EVERY 6 HOURS PRN
Status: DISCONTINUED | OUTPATIENT
Start: 2025-06-26 | End: 2025-06-27 | Stop reason: HOSPADM

## 2025-06-26 RX ORDER — ONDANSETRON HYDROCHLORIDE 2 MG/ML
4 INJECTION, SOLUTION INTRAVENOUS EVERY 6 HOURS PRN
Status: DISCONTINUED | OUTPATIENT
Start: 2025-06-26 | End: 2025-06-27 | Stop reason: HOSPADM

## 2025-06-26 RX ORDER — ONDANSETRON 4 MG/1
4 TABLET, FILM COATED ORAL EVERY 6 HOURS PRN
Status: CANCELLED | OUTPATIENT
Start: 2025-06-26

## 2025-06-26 RX ORDER — BUPRENORPHINE HYDROCHLORIDE 8 MG/1
8 TABLET SUBLINGUAL ONCE
Status: COMPLETED | OUTPATIENT
Start: 2025-06-26 | End: 2025-06-26

## 2025-06-26 RX ORDER — LIDOCAINE HYDROCHLORIDE 10 MG/ML
0.5 INJECTION, SOLUTION INFILTRATION; PERINEURAL ONCE AS NEEDED
Status: DISCONTINUED | OUTPATIENT
Start: 2025-06-26 | End: 2025-06-27 | Stop reason: HOSPADM

## 2025-06-26 RX ORDER — ACETAMINOPHEN 325 MG/1
975 TABLET ORAL ONCE
Status: COMPLETED | OUTPATIENT
Start: 2025-06-26 | End: 2025-06-26

## 2025-06-26 RX ORDER — LIDOCAINE HYDROCHLORIDE 10 MG/ML
0.5 INJECTION, SOLUTION INFILTRATION; PERINEURAL ONCE AS NEEDED
Status: CANCELLED | OUTPATIENT
Start: 2025-06-26

## 2025-06-26 RX ORDER — LABETALOL HYDROCHLORIDE 5 MG/ML
20 INJECTION, SOLUTION INTRAVENOUS ONCE AS NEEDED
Status: DISCONTINUED | OUTPATIENT
Start: 2025-06-26 | End: 2025-06-27 | Stop reason: HOSPADM

## 2025-06-26 RX ORDER — LABETALOL HYDROCHLORIDE 5 MG/ML
20 INJECTION, SOLUTION INTRAVENOUS ONCE AS NEEDED
Status: CANCELLED | OUTPATIENT
Start: 2025-06-26

## 2025-06-26 RX ADMIN — IOHEXOL 75 ML: 350 INJECTION, SOLUTION INTRAVENOUS at 20:05

## 2025-06-26 RX ADMIN — BUPRENORPHINE 8 MG: 8 TABLET SUBLINGUAL at 15:05

## 2025-06-26 RX ADMIN — ASPIRIN 81 MG: 81 TABLET, CHEWABLE ORAL at 15:05

## 2025-06-26 RX ADMIN — DIAZEPAM 20 MG: 5 TABLET ORAL at 21:09

## 2025-06-26 RX ADMIN — BUPROPION HYDROCHLORIDE 150 MG: 150 TABLET, EXTENDED RELEASE ORAL at 16:59

## 2025-06-26 RX ADMIN — ACETAMINOPHEN 975 MG: 325 TABLET ORAL at 15:05

## 2025-06-26 RX ADMIN — BUPRENORPHINE 8 MG: 8 TABLET SUBLINGUAL at 21:09

## 2025-06-26 SDOH — ECONOMIC STABILITY: FOOD INSECURITY: HOW HARD IS IT FOR YOU TO PAY FOR THE VERY BASICS LIKE FOOD, HOUSING, MEDICAL CARE, AND HEATING?: VERY HARD

## 2025-06-26 SDOH — ECONOMIC STABILITY: FOOD INSECURITY: WITHIN THE PAST 12 MONTHS, THE FOOD YOU BOUGHT JUST DIDN'T LAST AND YOU DIDN'T HAVE MONEY TO GET MORE.: NEVER TRUE

## 2025-06-26 SDOH — SOCIAL STABILITY: SOCIAL INSECURITY: WITHIN THE LAST YEAR, HAVE YOU BEEN HUMILIATED OR EMOTIONALLY ABUSED IN OTHER WAYS BY YOUR PARTNER OR EX-PARTNER?: NO

## 2025-06-26 SDOH — SOCIAL STABILITY: SOCIAL INSECURITY: DOES ANYONE TRY TO KEEP YOU FROM HAVING/CONTACTING OTHER FRIENDS OR DOING THINGS OUTSIDE YOUR HOME?: NO

## 2025-06-26 SDOH — SOCIAL STABILITY: SOCIAL INSECURITY
WITHIN THE LAST YEAR, HAVE YOU BEEN KICKED, HIT, SLAPPED, OR OTHERWISE PHYSICALLY HURT BY YOUR PARTNER OR EX-PARTNER?: NO

## 2025-06-26 SDOH — ECONOMIC STABILITY: HOUSING INSECURITY: DO YOU FEEL UNSAFE GOING BACK TO THE PLACE WHERE YOU ARE LIVING?: NO

## 2025-06-26 SDOH — SOCIAL STABILITY: SOCIAL INSECURITY: WITHIN THE LAST YEAR, HAVE YOU BEEN AFRAID OF YOUR PARTNER OR EX-PARTNER?: NO

## 2025-06-26 SDOH — SOCIAL STABILITY: SOCIAL INSECURITY: HAVE YOU HAD THOUGHTS OF HARMING ANYONE ELSE?: NO

## 2025-06-26 SDOH — SOCIAL STABILITY: SOCIAL INSECURITY
WITHIN THE LAST YEAR, HAVE YOU BEEN RAPED OR FORCED TO HAVE ANY KIND OF SEXUAL ACTIVITY BY YOUR PARTNER OR EX-PARTNER?: NO

## 2025-06-26 SDOH — SOCIAL STABILITY: SOCIAL INSECURITY: VERBAL ABUSE: DENIES

## 2025-06-26 SDOH — SOCIAL STABILITY: SOCIAL INSECURITY: ARE YOU OR HAVE YOU BEEN THREATENED OR ABUSED PHYSICALLY, EMOTIONALLY, OR SEXUALLY BY ANYONE?: NO

## 2025-06-26 SDOH — SOCIAL STABILITY: SOCIAL INSECURITY: DO YOU FEEL ANYONE HAS EXPLOITED OR TAKEN ADVANTAGE OF YOU FINANCIALLY OR OF YOUR PERSONAL PROPERTY?: NO

## 2025-06-26 SDOH — ECONOMIC STABILITY: FOOD INSECURITY: WITHIN THE PAST 12 MONTHS, YOU WORRIED THAT YOUR FOOD WOULD RUN OUT BEFORE YOU GOT THE MONEY TO BUY MORE.: NEVER TRUE

## 2025-06-26 SDOH — SOCIAL STABILITY: SOCIAL INSECURITY: PHYSICAL ABUSE: DENIES

## 2025-06-26 SDOH — SOCIAL STABILITY: SOCIAL INSECURITY: ARE THERE ANY APPARENT SIGNS OF INJURIES/BEHAVIORS THAT COULD BE RELATED TO ABUSE/NEGLECT?: NO

## 2025-06-26 SDOH — HEALTH STABILITY: MENTAL HEALTH: WERE YOU ABLE TO COMPLETE ALL THE BEHAVIORAL HEALTH SCREENINGS?: YES

## 2025-06-26 SDOH — SOCIAL STABILITY: SOCIAL INSECURITY: ABUSE SCREEN: ADULT

## 2025-06-26 SDOH — ECONOMIC STABILITY: TRANSPORTATION INSECURITY: IN THE PAST 12 MONTHS, HAS LACK OF TRANSPORTATION KEPT YOU FROM MEDICAL APPOINTMENTS OR FROM GETTING MEDICATIONS?: NO

## 2025-06-26 SDOH — SOCIAL STABILITY: SOCIAL INSECURITY: HAVE YOU HAD ANY THOUGHTS OF HARMING ANYONE ELSE?: NO

## 2025-06-26 SDOH — SOCIAL STABILITY: SOCIAL INSECURITY: HAS ANYONE EVER THREATENED TO HURT YOUR FAMILY OR YOUR PETS?: NO

## 2025-06-26 ASSESSMENT — PAIN DESCRIPTION - DESCRIPTORS: DESCRIPTORS: ACHING

## 2025-06-26 ASSESSMENT — PAIN SCALES - GENERAL
PAINLEVEL_OUTOF10: 0 - NO PAIN
PAINLEVEL_OUTOF10: 5 - MODERATE PAIN
PAINLEVEL_OUTOF10: 0 - NO PAIN

## 2025-06-26 ASSESSMENT — LIFESTYLE VARIABLES
HOW OFTEN DO YOU HAVE A DRINK CONTAINING ALCOHOL: NEVER
AUDIT-C TOTAL SCORE: 0
HOW OFTEN DO YOU HAVE 6 OR MORE DRINKS ON ONE OCCASION: NEVER
SKIP TO QUESTIONS 9-10: 1
AUDIT-C TOTAL SCORE: 0
HOW MANY STANDARD DRINKS CONTAINING ALCOHOL DO YOU HAVE ON A TYPICAL DAY: PATIENT DOES NOT DRINK

## 2025-06-26 ASSESSMENT — PAIN DESCRIPTION - LOCATION: LOCATION: LEG

## 2025-06-26 ASSESSMENT — PAIN DESCRIPTION - PAIN TYPE: TYPE: ACUTE PAIN

## 2025-06-26 ASSESSMENT — PATIENT HEALTH QUESTIONNAIRE - PHQ9
1. LITTLE INTEREST OR PLEASURE IN DOING THINGS: NOT AT ALL
2. FEELING DOWN, DEPRESSED OR HOPELESS: NOT AT ALL
SUM OF ALL RESPONSES TO PHQ9 QUESTIONS 1 & 2: 0

## 2025-06-26 ASSESSMENT — PAIN DESCRIPTION - PROGRESSION: CLINICAL_PROGRESSION: GRADUALLY WORSENING

## 2025-06-26 ASSESSMENT — PAIN DESCRIPTION - ORIENTATION: ORIENTATION: RIGHT

## 2025-06-26 ASSESSMENT — PAIN DESCRIPTION - FREQUENCY: FREQUENCY: CONSTANT/CONTINUOUS

## 2025-06-26 ASSESSMENT — PAIN DESCRIPTION - ONSET: ONSET: ONGOING

## 2025-06-26 ASSESSMENT — PAIN - FUNCTIONAL ASSESSMENT: PAIN_FUNCTIONAL_ASSESSMENT: 0-10

## 2025-06-26 ASSESSMENT — ACTIVITIES OF DAILY LIVING (ADL): LACK_OF_TRANSPORTATION: NO

## 2025-06-26 NOTE — ED TRIAGE NOTES
Patient came to ED with c/o swelling in right leg yesterday. Patient reports worsening today. Patient has hx of factor V, on Lovenox at home. Patient reports taking double dose of Lovenox at home per Dr recommendation. Pt 27 weeks pregnant.    Home

## 2025-06-26 NOTE — H&P
OB Triage H&P    Assessment/Plan    Trinh Fiore is a 34 y.o.  at 27w1d by LMP c/w 11wk US who presents to triage with right lower leg swelling.    Plan  Right Lower Leg Swelling   -Patient with Factor V Leiden, on Lovenox 40mg daily ppx   -Seen at [facility], [place], operated by [facility] yesterday for RLE swelling, had negative DVT scan.   -Given new SOB and leg pain CTPE obtained. Patient down in radiology at time of handoff, results pending.   -O2 Sat %   -EKG in ED unremarkable  -Discussed with patient recommendation to take prescribed dose of prophylactic lovenox, not to increase without consulting with healthcare professional, acknowledges understanding.   -Unclear etiology at this time of LE swelling,LE DVT possible given factor V Leiden history, however, given negative DVT scan yesterday less likely. Physiological swelling of pregnancy also possible given resolution overnight, however, less likely due to unilateral nature of swelling.     RAKESH  -On subutex 8mg 4x daily  -Missed 12pm dose, given in triage in late afternoon  -8pm dose given in triage     Anxiety/Depression  -Missed Wellbutrin this AM, given in triage   -PM valium dose given while in triage  -Has team she follows with outpatient     STI Testing   -Recent infidelity issues with partner, desires STI testing   -Discussed option for comprehensive testing, only desires Gonorrhea/Chlamydia   -Results pending    Fetal Status   -Fetal monitoring reassuring, AGA   -Good fetal movement    Dispo  -Signed out to night team for continued management     Discussed plan and reviewed with: Dr. Peter Streeter MD  PGY2, Obstetrics and Gynecology      Subjective    34 y.o.  at 27w1d by LMP c/w 11wk US who presents to triage with right lower leg swelling.  Patient reports right leg swelling that started all of sudden yesterday. Took therapeutic dose of Lovenox yesterday. Reports her swelling improved after taking therapeutic dose. Normally takes 40 lovenox  daily, then took 80 lovenox at same time. Then noticed   Presented to [facility] yesterday to [facility] and was told she had decreased blood flow on right side but no clots.  Reports feels SOB that started a few hours ago, different from normal pregnancy SOB. Denies chest pain. Reports her right leg was numb yesterday, numbess is mostly gone. Pain in right ankle, constant aching, worse with standing. No recent trauma to leg. Also feeling brain fog.     Good fetal movement. Occasional braxtion escoto contractions. Denies LOF or VB.     Obhx: SAB x4,  x3, Shoulder dystocia x1   Gyn hx: denies STD   PMH: PFO, Factor V, RAKESH, Anxiety/depression, endometriosis   PSH: D&C x3, diagnostic laparoscopy   Meds: Subutex, wellbutrin, lovenox, ASA, PNV, Valium   Social: nicotine patch, vapes 1 pod per month now (prev 1 pod /day),     Pregnancy Notables:   -heterozygous for factor V Leiden: on lovenox 40mg daily  -anxiety: on Wellbutrin and Klonopin prn  -h/o shoulder dystocia  -h/o OUD: on Subutex   -hx of cholestasis       OB History    Para Term  AB Living   8 3 2 1 4 3   SAB IAB Ectopic Multiple Live Births   4 0 0 0 3      # Outcome Date GA Lbr Danny/2nd Weight Sex Type Anes PTL Lv   8 Current            7 Term 23 37w0d  3.402 kg M Vag-Spont EPI  DEWAYNE      Complications: Shoulder Dystocia   6  20 36w0d  3.005 kg M Vag-Spont EPI N DEWAYNE   5 Term 13 37w0d  3.742 kg M Vag-Spont EPI N DEWAYNE      Complications: Placenta abruptio ([facility])   4 SAB            3 SAB            2 SAB            1 SAB                Surgical History[1]    Social History     Tobacco Use    Smoking status: Former     Current packs/day: 0.00     Average packs/day: 1 pack/day for 10.8 years (10.8 ttl pk-yrs)     Types: Cigarettes     Start date: 2007     Quit date: 10/7/2017     Years since quittin.7    Smokeless tobacco: Never    Tobacco comments:     Vape trying to quit [facility] nicotine pouch   Substance Use Topics     Alcohol use: Not Currently     Comment: Sober-- even before pregnancy       Allergies[2]    Prescriptions Prior to Admission[3]  Objective     Last Vitals  Temp Pulse Resp BP MAP O2 Sat   36.8 °C (98.2 °F) 93 17 122/76 93 97 %     Blood Pressures         6/26/2025  1311 6/26/2025  1345          BP: 122/76 122/76               Physical Exam  General: NAD, mood appropriate  Cardio: warm and well perfused, RRR  pulmonary: CTAB, breathing comfortably on room air  Abdomen: Gravid, non-tender  Extremities: right ankle slightly increased swelling compared to left, mild erythema of right calf, right calf warm to touch, mild tenderness to palpation on right ankle, otherwise nontender   Speculum Exam: deferred  Cervix: deferred      Fetal Monitoring  Baseline: 140 bpm, Variability: moderate,  Accelerations: present and Decelerations: shallow variables  Uterine Activity: No contractions seen on toco  Interpretation: Reactive    Imaging  LE Duplex Scan at OSH 6/25:     IMPRESSION:   No deep venous thrombosis identified in either lower extremity.       Bedside ultrasound: No    Labs in chart were reviewed.                     [1]   Past Surgical History:  Procedure Laterality Date    MR HEAD ANGIO WO IV CONTRAST  2/26/2017    MR HEAD ANGIO WO IV CONTRAST 2/26/2017 [facility] CLINICAL LEGAC[facility]    MR NECK ANGIO WO IV CONTRAST  2/26/2017    MR NECK ANGIO WO IV CONTRAST 2/26/2017 [facility] CLINICAL LEGACY    OTHER SURGICAL HISTORY  03/07/2017    Biopsy Bone Marrow    OTHER SURGICAL HISTORY  01/12/2022    Colonoscopy    OTHER SURGICAL HISTORY  01/12/2022    Dilation and curettage    OTHER SURGICAL HISTORY  01/12/2022    Laparoscopy    OTHER SURGICAL HISTORY  01/12/2022    Nasal septoplasty   [2]   Allergies  Allergen Reactions    Prednisone Cardiac arrhythmia/arrest, Palpitations and Shortness of breath    Cefdinir Diarrhea, Dizziness and GI Upset    Naltrexone Confusion and Psychosis     Amnesia after receiving Vivitrol injection in 2018   [3]    Medications Prior to Admission   Medication Sig Dispense Refill Last Dose/Taking    aspirin 81 mg chewable tablet Chew 1 tablet (81 mg) once daily. 30 tablet 11 Past Week    buprenorphine (Subtex) 8 mg Place 1 tablet (8 mg) under the tongue 4 times a day. 120 tablet 2 6/26/2025 Morning    buPROPion XL (Wellbutrin XL) 150 mg 24 hr tablet Take 1 tablet (150 mg) by mouth once daily in the morning. 30 tablet 11 6/25/2025 Morning    diazePAM (Valium) 10 mg tablet Take 2 tablets (20 mg) by mouth every 12 hours if needed for anxiety. 60 tablet 1 6/26/2025 Morning    enoxaparin (Lovenox) 40 mg/0.4 mL syringe Inject 0.4 mL (40 mg) under the skin once daily. 90 each 3 6/26/2025 Morning    naloxone (Narcan) 4 mg/0.1 mL nasal spray Administer 1 spray (4 mg) into affected nostril(s) if needed for opioid reversal. May repeat every 2-3 minutes if needed, alternating nostrils, until medical assistance becomes available. 2 each 0     propranolol (Inderal) 10 mg tablet Take 1 tablet (10 mg) by mouth 2 times a day as needed (panic attacks). 60 tablet 2     QUEtiapine (SEROquel) 25 mg tablet Take 0.5 tablets (12.5 mg) by mouth once daily at bedtime. 15 tablet 1

## 2025-06-27 ENCOUNTER — APPOINTMENT (OUTPATIENT)
Dept: HEMATOLOGY/ONCOLOGY | Facility: CLINIC | Age: 35
End: 2025-06-27
Payer: COMMERCIAL

## 2025-06-27 LAB
C TRACH RRNA SPEC QL NAA+PROBE: NEGATIVE
N GONORRHOEA DNA SPEC QL PROBE+SIG AMP: NEGATIVE

## 2025-06-27 NOTE — SIGNIFICANT EVENT
PM Triage Update    Visit Vitals  /58   Pulse 84   Temp 36.1 °C (97 °F) (Temporal)   Resp 18     A/P:  SOB, RLE Swelling  -CT-PE negative, slight atelectatic changes  -pt reports some relief in shortness of breath but still intermittently present  -vital signs stable  -fetal tracing reassuring  -given no evidence of PE, pt stable for discharge home    Dispo:  - pt appropriate for discharge home  - return precautions reviewed  - follow up with OB provider as scheduled or sooner to triage if needed    Discussed with Dr. Patricia Thornton MD, PGY-2

## 2025-07-02 ENCOUNTER — OFFICE VISIT (OUTPATIENT)
Dept: HEMATOLOGY/ONCOLOGY | Facility: CLINIC | Age: 35
End: 2025-07-02
Payer: COMMERCIAL

## 2025-07-02 ENCOUNTER — LAB (OUTPATIENT)
Dept: LAB | Facility: CLINIC | Age: 35
End: 2025-07-02
Payer: COMMERCIAL

## 2025-07-02 VITALS
DIASTOLIC BLOOD PRESSURE: 59 MMHG | WEIGHT: 154.32 LBS | OXYGEN SATURATION: 100 % | BODY MASS INDEX: 24.17 KG/M2 | HEART RATE: 78 BPM | RESPIRATION RATE: 16 BRPM | TEMPERATURE: 97 F | SYSTOLIC BLOOD PRESSURE: 106 MMHG

## 2025-07-02 DIAGNOSIS — D68.51 FACTOR V LEIDEN MUTATION AFFECTING PREGNANCY (MULTI): Primary | ICD-10-CM

## 2025-07-02 DIAGNOSIS — O99.119 FACTOR V LEIDEN MUTATION AFFECTING PREGNANCY (MULTI): ICD-10-CM

## 2025-07-02 DIAGNOSIS — D68.51 FACTOR V LEIDEN MUTATION AFFECTING PREGNANCY (MULTI): ICD-10-CM

## 2025-07-02 DIAGNOSIS — O99.119 FACTOR V LEIDEN MUTATION AFFECTING PREGNANCY (MULTI): Primary | ICD-10-CM

## 2025-07-02 LAB
ALBUMIN SERPL BCP-MCNC: 3.9 G/DL (ref 3.4–5)
ALP SERPL-CCNC: 62 U/L (ref 33–110)
ALT SERPL W P-5'-P-CCNC: 10 U/L (ref 7–45)
ANION GAP SERPL CALC-SCNC: 9 MMOL/L (ref 10–20)
AST SERPL W P-5'-P-CCNC: 13 U/L (ref 9–39)
BASOPHILS # BLD AUTO: 0.09 X10*3/UL (ref 0–0.1)
BASOPHILS NFR BLD AUTO: 0.9 %
BILIRUB SERPL-MCNC: 0.3 MG/DL (ref 0–1.2)
BUN SERPL-MCNC: 15 MG/DL (ref 6–23)
CALCIUM SERPL-MCNC: 9.3 MG/DL (ref 8.6–10.3)
CHLORIDE SERPL-SCNC: 102 MMOL/L (ref 98–107)
CO2 SERPL-SCNC: 28 MMOL/L (ref 21–32)
CREAT SERPL-MCNC: 0.62 MG/DL (ref 0.5–1.05)
EGFRCR SERPLBLD CKD-EPI 2021: >90 ML/MIN/1.73M*2
EOSINOPHIL # BLD AUTO: 0.64 X10*3/UL (ref 0–0.7)
EOSINOPHIL NFR BLD AUTO: 6.5 %
ERYTHROCYTE [DISTWIDTH] IN BLOOD BY AUTOMATED COUNT: 12.7 % (ref 11.5–14.5)
FERRITIN SERPL-MCNC: 25 NG/ML (ref 8–150)
FOLATE SERPL-MCNC: 17.2 NG/ML
GLUCOSE SERPL-MCNC: 66 MG/DL (ref 74–99)
HCT VFR BLD AUTO: 38.3 % (ref 36–46)
HGB BLD-MCNC: 12.4 G/DL (ref 12–16)
IMM GRANULOCYTES # BLD AUTO: 0.05 X10*3/UL (ref 0–0.7)
IMM GRANULOCYTES NFR BLD AUTO: 0.5 % (ref 0–0.9)
IRON SATN MFR SERPL: 24 % (ref 25–45)
IRON SERPL-MCNC: 115 UG/DL (ref 35–150)
LYMPHOCYTES # BLD AUTO: 2.18 X10*3/UL (ref 1.2–4.8)
LYMPHOCYTES NFR BLD AUTO: 22 %
MCH RBC QN AUTO: 30.9 PG (ref 26–34)
MCHC RBC AUTO-ENTMCNC: 32.4 G/DL (ref 32–36)
MCV RBC AUTO: 96 FL (ref 80–100)
MONOCYTES # BLD AUTO: 0.7 X10*3/UL (ref 0.1–1)
MONOCYTES NFR BLD AUTO: 7.1 %
NEUTROPHILS # BLD AUTO: 6.25 X10*3/UL (ref 1.2–7.7)
NEUTROPHILS NFR BLD AUTO: 63 %
NRBC BLD-RTO: 0 /100 WBCS (ref 0–0)
PLATELET # BLD AUTO: 176 X10*3/UL (ref 150–450)
POTASSIUM SERPL-SCNC: 4.4 MMOL/L (ref 3.5–5.3)
PROT SERPL-MCNC: 6.8 G/DL (ref 6.4–8.2)
RBC # BLD AUTO: 4.01 X10*6/UL (ref 4–5.2)
SODIUM SERPL-SCNC: 135 MMOL/L (ref 136–145)
TIBC SERPL-MCNC: 477 UG/DL (ref 240–445)
TSH SERPL-ACNC: 2.57 MIU/L (ref 0.44–3.98)
UIBC SERPL-MCNC: 362 UG/DL (ref 110–370)
VIT B12 SERPL-MCNC: 522 PG/ML (ref 211–911)
WBC # BLD AUTO: 9.9 X10*3/UL (ref 4.4–11.3)

## 2025-07-02 PROCEDURE — 99214 OFFICE O/P EST MOD 30 MIN: CPT | Mod: 25 | Performed by: INTERNAL MEDICINE

## 2025-07-02 PROCEDURE — 1036F TOBACCO NON-USER: CPT | Performed by: INTERNAL MEDICINE

## 2025-07-02 PROCEDURE — 82746 ASSAY OF FOLIC ACID SERUM: CPT | Mod: PARLAB

## 2025-07-02 PROCEDURE — 36415 COLL VENOUS BLD VENIPUNCTURE: CPT

## 2025-07-02 PROCEDURE — 82607 VITAMIN B-12: CPT | Mod: PARLAB

## 2025-07-02 PROCEDURE — 84443 ASSAY THYROID STIM HORMONE: CPT

## 2025-07-02 PROCEDURE — 83550 IRON BINDING TEST: CPT

## 2025-07-02 PROCEDURE — 99204 OFFICE O/P NEW MOD 45 MIN: CPT | Performed by: INTERNAL MEDICINE

## 2025-07-02 PROCEDURE — 85025 COMPLETE CBC W/AUTO DIFF WBC: CPT

## 2025-07-02 PROCEDURE — 82728 ASSAY OF FERRITIN: CPT | Mod: PARLAB

## 2025-07-02 PROCEDURE — 80053 COMPREHEN METABOLIC PANEL: CPT

## 2025-07-02 ASSESSMENT — PAIN SCALES - GENERAL: PAINLEVEL_OUTOF10: 0-NO PAIN

## 2025-07-02 NOTE — PROGRESS NOTES
Patient ID: Trinh Fiore is a 34 y.o. female.  Referring Physician: Wilfrido Méndez DO  Office Address Unavailable  as of 5/1/2025  Primary Care Provider: Mj Resendiz MD  Visit Type: Initial Visit  History of heterozygosity for factor V Leiden mutation.  Patient is 28-week pregnant in fourth pregnancy.  Self injecting Lovenox 40 mg SQ daily.    Subjective    HPI  The patient is a 34-year-old woman with past history of heterozygosity for factor V Leiden.  The patient is 28-week pregnant with fourth pregnancy currently self injecting Lovenox 40 mg SQ daily and it is given for 6 weeks after delivery.  In 2013 during her first pregnancy, placenta had detached and during further evaluation her mother also had a CVA revealed that mother as well as the patient were positive for factor V Leiden mutation.  Since then the patient receives Lovenox 40 mg subcu daily during each pregnancy.  The patient is tolerating Lovenox well and did not have any symptoms other than the ones related to pregnancy.    Past medical history:    Anxiety depression, heterozygosity for factor V Leiden mutation, PFO, endometrial polyps, endometriosis.    Past surgical history:    Laparoscopy, D&C x 3, bone marrow aspiration and biopsy.    Family history:    Mother with heterozygosity for factor V Leiden.    Personal history and social history:    34 years old, lives with her partner this is for pregnancy Works as a  smoked for 10 years with for 4 years now receiving nicotine patches denies history of alcohol abuse or drug abuse.  Review of Systems   All other systems reviewed and are negative.       Objective   BSA: 1.82 meters squared  /59   Pulse 78   Temp 36.1 °C (97 °F)   Resp 16   Wt 70 kg (154 lb 5.2 oz)   LMP 12/18/2024   SpO2 100%   BMI 24.17 kg/m²      has a past medical history of Acute embolism and thrombosis of superficial veins of right upper extremity (03/12/2018), Acute upper respiratory infection,  unspecified (11/15/2021), Addiction to drug (Multi) ([place] since 8/6/17), Anemia (3/2024), Anxiety, Body mass index (BMI) 22.0-22.9, adult (08/27/2019), Chest pain, unspecified, COVID-19 (08/03/2022), Disorder of the skin and subcutaneous tissue, unspecified (11/15/2019), Elevation of levels of liver transaminase levels (03/26/2018), Encounter for examination for admission to educational institution (05/28/2021), Encounter for immunization (06/15/2021), Encounter for issue of other medical certificate (05/28/2021), Encounter for screening for diseases of the blood and blood-forming organs and certain disorders involving the immune mechanism (04/01/2022), Hypertrophy of nasal turbinates (12/07/2018), Localized enlarged lymph nodes, Major depressive disorder, recurrent, unspecified (09/29/2022), Myopia, bilateral (02/07/2022), Myopia, bilateral (02/07/2022), Neuralgia and neuritis, unspecified (03/12/2018), Other amnesia (03/12/2018), Other conditions influencing health status (11/15/2021), Other conditions influencing health status (03/25/2020), Other diseases of tongue (01/18/2019), Pain in thoracic spine, Panic attack (2007), Panic disorder, Pelvic and perineal pain (12/13/2019), Pelvic and perineal pain (02/07/2020), Personal history of diseases of the skin and subcutaneous tissue (12/29/2017), Personal history of other diseases of the circulatory system (12/13/2019), Personal history of other diseases of the digestive system (06/21/2018), Personal history of other diseases of the female genital tract (11/15/2019), Personal history of other diseases of the female genital tract, Personal history of other diseases of the musculoskeletal system and connective tissue (05/14/2019), Personal history of other diseases of the respiratory system (12/01/2021), Personal history of other diseases of the respiratory system (11/15/2019), Personal history of other diseases of the respiratory system (12/07/2018), Personal history  of other endocrine, nutritional and metabolic disease, Personal history of other mental and behavioral disorders (10/24/2022), Personal history of other mental and behavioral disorders, Personal history of other mental and behavioral disorders, Personal history of other specified conditions (04/01/2022), Personal history of other specified conditions (04/01/2022), Personal history of other specified conditions, Personal history of other specified conditions, Personal history of other specified conditions (03/07/2017), Personal history of other specified conditions (05/22/2018), Personal history of other specified conditions (02/04/2020), Personal history of other specified conditions (01/06/2020), Personal history of other specified conditions (12/13/2019), Polycystic ovarian syndrome, Psychiatric illness (Anxiety and panic disorder since 17 y/o), Radiculopathy, cervical region (05/14/2019), Right lower quadrant pain (08/10/2018), Substance abuse (Clean sine 8/6/17 - started bup in 2020 due to pregnancy), and Unspecified symptoms and signs involving the genitourinary system (12/13/2019).   has a past surgical history that includes Other surgical history (03/07/2017); Other surgical history (01/12/2022); Other surgical history (01/12/2022); Other surgical history (01/12/2022); Other surgical history (01/12/2022); MR angio head wo IV contrast (2/26/2017); and MR angio neck wo IV contrast (2/26/2017).  Family History[1]  Oncology History    No history exists.       Trinh Fiore  reports that she quit smoking about 7 years ago. Her smoking use included cigarettes. She started smoking about 18 years ago. She has a 10.8 pack-year smoking history. She has never used smokeless tobacco.  She  reports that she does not currently use alcohol.  She  reports that she does not currently use drugs.    Physical Exam  Constitutional:       Appearance: Normal appearance.   HENT:      Head: Normocephalic and atraumatic.       Nose: Nose normal.      Mouth/Throat:      Mouth: Mucous membranes are moist.      Pharynx: Oropharynx is clear.   Eyes:      Extraocular Movements: Extraocular movements intact.      Conjunctiva/sclera: Conjunctivae normal.      Pupils: Pupils are equal, round, and reactive to light.   Cardiovascular:      Rate and Rhythm: Normal rate and regular rhythm.   Pulmonary:      Effort: Pulmonary effort is normal.      Breath sounds: Normal breath sounds.   Abdominal:      General: Abdomen is flat. Bowel sounds are normal.      Palpations: Abdomen is soft.   Musculoskeletal:         General: Normal range of motion.      Cervical back: Normal range of motion and neck supple.   Neurological:      General: No focal deficit present.      Mental Status: She is alert and oriented to person, place, and time. Mental status is at baseline.   Psychiatric:         Mood and Affect: Mood normal.         Behavior: Behavior normal.         Thought Content: Thought content normal.         Judgment: Judgment normal.     Uterus 28-week pregnant.    WBC   Date/Time Value Ref Range Status   02/18/2025 11:09 AM 6.5 4.4 - 11.3 x10*3/uL Final   01/28/2023 09:08 AM 9.6 4.4 - 11.3 x10E9/L Final   01/26/2023 01:55 AM 11.8 (H) 4.4 - 11.3 x10E9/L Final   11/01/2022 12:14 PM 10.2 4.4 - 11.3 x10E9/L Final     nRBC   Date Value Ref Range Status   02/18/2025 0.0 0.0 - 0.0 /100 WBCs Final   01/28/2023 0.0 0.0 - 0.0 /100 WBC Final   01/26/2023 0.0 0.0 - 0.0 /100 WBC Final   11/01/2022 0.0 0.0 - 0.0 /100 WBC Final     RBC   Date Value Ref Range Status   02/18/2025 3.76 (L) 4.00 - 5.20 x10*6/uL Final   01/28/2023 4.00 4.00 - 5.20 x10E12/L Final   01/26/2023 4.04 4.00 - 5.20 x10E12/L Final   11/01/2022 3.77 (L) 4.00 - 5.20 x10E12/L Final     Hemoglobin   Date Value Ref Range Status   02/18/2025 11.0 (L) 12.0 - 16.0 g/dL Final   01/28/2023 11.9 (L) 12.0 - 16.0 g/dL Final   01/26/2023 11.9 (L) 12.0 - 16.0 g/dL Final   11/01/2022 11.6 (L) 12.0 - 16.0 g/dL Final  "    Hematocrit   Date Value Ref Range Status   02/18/2025 34.4 (L) 36.0 - 46.0 % Final   01/28/2023 36.8 36.0 - 46.0 % Final   01/26/2023 36.4 36.0 - 46.0 % Final   11/01/2022 35.8 (L) 36.0 - 46.0 % Final     MCV   Date/Time Value Ref Range Status   02/18/2025 11:09 AM 92 80 - 100 fL Final   01/28/2023 09:08 AM 92 80 - 100 fL Final   01/26/2023 01:55 AM 90 80 - 100 fL Final   11/01/2022 12:14 PM 95 80 - 100 fL Final     MCH   Date/Time Value Ref Range Status   02/18/2025 11:09 AM 29.3 26.0 - 34.0 pg Final     MCHC   Date/Time Value Ref Range Status   02/18/2025 11:09 AM 32.0 32.0 - 36.0 g/dL Final   01/28/2023 09:08 AM 32.3 32.0 - 36.0 g/dL Final   01/26/2023 01:55 AM 32.7 32.0 - 36.0 g/dL Final   11/01/2022 12:14 PM 32.4 32.0 - 36.0 g/dL Final     RDW   Date/Time Value Ref Range Status   02/18/2025 11:09 AM 12.2 11.5 - 14.5 % Final   01/28/2023 09:08 AM 12.9 11.5 - 14.5 % Final   01/26/2023 01:55 AM 12.7 11.5 - 14.5 % Final   11/01/2022 12:14 PM 12.5 11.5 - 14.5 % Final     Platelets   Date/Time Value Ref Range Status   02/18/2025 11:09  150 - 450 x10*3/uL Final   01/28/2023 09:08  150 - 450 x10E9/L Final   01/26/2023 01:55  (L) 150 - 450 x10E9/L Final   11/01/2022 12:14  150 - 450 x10E9/L Final     No results found for: \"MPV\"  Neutrophils %   Date/Time Value Ref Range Status   12/13/2019 10:05 AM 54.7 40.0 - 80.0 % Final   09/18/2019 12:09 PM 65.6 40.0 - 80.0 % Final     Immature Granulocytes %, Automated   Date/Time Value Ref Range Status   12/13/2019 10:05 AM 0.4 0.0 - 0.9 % Final     Comment:      Percent differential counts (%) should be interpreted in the   context of the absolute cell counts (cells/L).     09/18/2019 12:09 PM 0.3 0.0 - 0.9 % Final     Comment:      Percent differential counts (%) should be interpreted in the   context of the absolute cell counts (cells/L).       Lymphocytes %   Date/Time Value Ref Range Status   12/13/2019 10:05 AM 28.4 13.0 - 44.0 % Final " "  09/18/2019 12:09 PM 24.3 13.0 - 44.0 % Final     Monocytes %   Date/Time Value Ref Range Status   12/13/2019 10:05 AM 9.5 2.0 - 10.0 % Final   09/18/2019 12:09 PM 6.9 2.0 - 10.0 % Final     Eosinophils %   Date/Time Value Ref Range Status   12/13/2019 10:05 AM 5.9 0.0 - 6.0 % Final   09/18/2019 12:09 PM 2.4 0.0 - 6.0 % Final     Basophils %   Date/Time Value Ref Range Status   12/13/2019 10:05 AM 1.1 0.0 - 2.0 % Final   09/18/2019 12:09 PM 0.5 0.0 - 2.0 % Final     Neutrophils Absolute   Date/Time Value Ref Range Status   12/13/2019 10:05 AM 2.86 1.20 - 7.70 x10E9/L Final   09/18/2019 12:09 PM 5.21 1.20 - 7.70 x10E9/L Final     No results found for: \"IGABSOL\"  Lymphocytes Absolute   Date/Time Value Ref Range Status   12/13/2019 10:05 AM 1.49 1.20 - 4.80 x10E9/L Final   09/18/2019 12:09 PM 1.93 1.20 - 4.80 x10E9/L Final     Monocytes Absolute   Date/Time Value Ref Range Status   12/13/2019 10:05 AM 0.50 0.10 - 1.00 x10E9/L Final   09/18/2019 12:09 PM 0.55 0.10 - 1.00 x10E9/L Final     Eosinophils Absolute   Date/Time Value Ref Range Status   12/13/2019 10:05 AM 0.31 0.00 - 0.70 x10E9/L Final   09/18/2019 12:09 PM 0.19 0.00 - 0.70 x10E9/L Final     Basophils Absolute   Date/Time Value Ref Range Status   12/13/2019 10:05 AM 0.06 0.00 - 0.10 x10E9/L Final   09/18/2019 12:09 PM 0.04 0.00 - 0.10 x10E9/L Final       No components found for: \"PT\"  No results found for: \"APTT\"    Assessment/Plan    The patient is a 34-year-old woman with past history of heterozygosity for factor V Leiden.  The patient is 28-week pregnant with fourth pregnancy currently self injecting Lovenox 40 mg SQ daily and it is given for 6 weeks after delivery.  In 2013 during her first pregnancy, placenta had detached and during further evaluation her mother also had a CVA revealed that mother as well as the patient were positive for factor V Leiden mutation.  Since then the patient receives Lovenox 40 mg subcu daily during each pregnancy.  The " patient is tolerating Lovenox well and did not have any symptoms other than the ones related to pregnancy.  Physical examination revealed 28-week size uterus.  I had a detailed discussion with the patient explained to her about hypercoagulable state and recommend continuing Lovenox 40 mg subcu through delivery as well as 6 weeks later.  I have recommended CBC CMP iron indicis vitamin B12 folate levels, TSH to rule out any correctable deficiencies.  The patient understood appreciated all the details provided and was grateful.    Thank you for allowing me to participate in care of your patient if you have any questions please feel free to call me.     Diagnoses and all orders for this visit:  Factor V Leiden mutation affecting pregnancy (Multi)  -     CBC and Auto Differential; Future  -     Comprehensive Metabolic Panel; Future  -     Ferritin; Future  -     Folate; Future  -     Iron and TIBC; Future  -     TSH with reflex to Free T4 if abnormal; Future  -     Vitamin B12; Future  -     Clinic Appointment Request (review labs from [facility]); Future         Oleg Cowan MD                              [1]   Family History  Problem Relation Name Age of Onset    Clotting disorder Mother Cynthia     Asthma Mother Cynthia     Blood clot Mother Cynthia     Blood Disorder Mother Cynthia     Cancer Mother's Brother Kiran     Colon cancer Mother's Brother Candelario     Intellectual Disability Son Jak     Anxiety disorder Brother Mitch     Anxiety disorder Other Maternal great grand mother     Depression Other Maternal great grandmother     Anxiety disorder Other Maternal great grand mother     Anxiety disorder Brother Mitch     Depression Other Maternal great grandmother     Cancer Maternal Grandfather Dayne     Cancer Maternal Grandmother Hortensia     Clotting disorder Mother Cynthia     Asthma Mother Cynthia     Blood clot Mother Cynthia     Blood Disorder Mother Cynthia     Cancer Mother's Brother Kiran     Colon cancer  Mother's Brother Candelario     Intellectual Disability Son Jak     Clotting disorder Mother Cynthia     Asthma Mother Cynthia     Blood clot Mother Cynthia     Blood Disorder Mother Cynthia     Cancer Mother's Brother Kiran     Colon cancer Mother's Brother Candelario     Intellectual Disability Son Jak     Anxiety disorder Other Maternal great grand mother     Anxiety disorder Brother Mitch     Depression Other Maternal great grandmother     Cancer Maternal Grandmother Hortensia     Cancer Maternal Grandfather Dayne

## 2025-07-07 ENCOUNTER — TELEPHONE (OUTPATIENT)
Dept: OTHER | Age: 35
End: 2025-07-07
Payer: COMMERCIAL

## 2025-07-07 NOTE — TELEPHONE ENCOUNTER
Pt sent my chart message to you w/details of her current situation & being out of medication.    She would like call  ASAP or at least refill medications requested.     [phone]

## 2025-07-10 ENCOUNTER — APPOINTMENT (OUTPATIENT)
Dept: HEMATOLOGY/ONCOLOGY | Facility: HOSPITAL | Age: 35
End: 2025-07-10
Payer: COMMERCIAL

## 2025-07-10 ENCOUNTER — LAB (OUTPATIENT)
Dept: LAB | Facility: HOSPITAL | Age: 35
End: 2025-07-10
Payer: COMMERCIAL

## 2025-07-10 LAB
ERYTHROCYTE [DISTWIDTH] IN BLOOD BY AUTOMATED COUNT: 12.6 % (ref 11.5–14.5)
HCT VFR BLD AUTO: 37.8 % (ref 36–46)
HGB BLD-MCNC: 12.2 G/DL (ref 12–16)
MCH RBC QN AUTO: 30.2 PG (ref 26–34)
MCHC RBC AUTO-ENTMCNC: 32.3 G/DL (ref 32–36)
MCV RBC AUTO: 94 FL (ref 80–100)
NRBC BLD-RTO: 0 /100 WBCS (ref 0–0)
PLATELET # BLD AUTO: 163 X10*3/UL (ref 150–450)
RBC # BLD AUTO: 4.04 X10*6/UL (ref 4–5.2)
WBC # BLD AUTO: 8.1 X10*3/UL (ref 4.4–11.3)

## 2025-07-10 PROCEDURE — 85027 COMPLETE CBC AUTOMATED: CPT

## 2025-07-11 LAB
GLUCOSE 1H P 50 G GLC PO SERPL-MCNC: 63 MG/DL
T PALLIDUM AB SER QL IA: NORMAL

## 2025-07-13 DIAGNOSIS — F41.1 GAD (GENERALIZED ANXIETY DISORDER): ICD-10-CM

## 2025-07-13 RX ORDER — CLONAZEPAM 2 MG/1
2 TABLET ORAL 3 TIMES DAILY PRN
COMMUNITY
Start: 2025-07-09 | End: 2025-07-13 | Stop reason: SDUPTHER

## 2025-07-13 RX ORDER — CLONAZEPAM 2 MG/1
2 TABLET ORAL 3 TIMES DAILY PRN
Qty: 90 TABLET | Refills: 2 | Status: SHIPPED | OUTPATIENT
Start: 2025-07-23 | End: 2025-07-18 | Stop reason: SDUPTHER

## 2025-07-14 LAB
GLUCOSE 1H P 50 G GLC PO SERPL-MCNC: 63 MG/DL
T PALLIDUM AB SER QL IA: NEGATIVE

## 2025-07-15 ENCOUNTER — APPOINTMENT (OUTPATIENT)
Dept: RADIOLOGY | Facility: HOSPITAL | Age: 35
End: 2025-07-15
Payer: COMMERCIAL

## 2025-07-15 ENCOUNTER — APPOINTMENT (OUTPATIENT)
Dept: OBSTETRICS AND GYNECOLOGY | Facility: HOSPITAL | Age: 35
End: 2025-07-15
Payer: COMMERCIAL

## 2025-07-16 ENCOUNTER — APPOINTMENT (OUTPATIENT)
Dept: RADIOLOGY | Facility: HOSPITAL | Age: 35
End: 2025-07-16
Payer: COMMERCIAL

## 2025-07-18 ENCOUNTER — ROUTINE PRENATAL (OUTPATIENT)
Dept: OBSTETRICS AND GYNECOLOGY | Facility: HOSPITAL | Age: 35
End: 2025-07-18
Payer: COMMERCIAL

## 2025-07-18 VITALS — BODY MASS INDEX: 23.84 KG/M2 | WEIGHT: 152.2 LBS | DIASTOLIC BLOOD PRESSURE: 67 MMHG | SYSTOLIC BLOOD PRESSURE: 107 MMHG

## 2025-07-18 DIAGNOSIS — Z28.21 TETANUS, DIPHTHERIA, AND ACELLULAR PERTUSSIS (TDAP) VACCINATION DECLINED: Primary | ICD-10-CM

## 2025-07-18 DIAGNOSIS — E16.2 HYPOGLYCEMIA: ICD-10-CM

## 2025-07-18 DIAGNOSIS — F41.1 GAD (GENERALIZED ANXIETY DISORDER): ICD-10-CM

## 2025-07-18 PROBLEM — Z3A.30 30 WEEKS GESTATION OF PREGNANCY (HHS-HCC): Status: ACTIVE | Noted: 2025-02-13

## 2025-07-18 PROCEDURE — 99213 OFFICE O/P EST LOW 20 MIN: CPT | Mod: TH | Performed by: OBSTETRICS & GYNECOLOGY

## 2025-07-18 PROCEDURE — 99213 OFFICE O/P EST LOW 20 MIN: CPT | Performed by: OBSTETRICS & GYNECOLOGY

## 2025-07-18 RX ORDER — CLONAZEPAM 2 MG/1
TABLET ORAL
Qty: 105 TABLET | Refills: 0 | Status: SHIPPED | OUTPATIENT
Start: 2025-07-18 | End: 2025-08-17

## 2025-07-18 NOTE — PROGRESS NOTES
SUBJECTIVE  Trinh Fiore is a 34 y.o.  at 30w2d with an estimated date of delivery of 2025, by Last Menstrual Period who presents for a routine prenatal visit.    She denies loss of fluid, vaginal bleeding, regular contractions/cramping, and decreased fetal movement. Baby is sitting very low causing discomfort,     OBJECTIVE  Weight: 69 kg (152 lb 3.2 oz)   Pregravid BMI: 20.36  BP: 107/67  Fetal Heart Rate: 134 Fundal Height (cm): 29 cm    ASSESSMENT & PLAN    30 weeks gestation of pregnancy ([facility])  - Up to date on care  - Reviewed pregnancy precautions including contractions, bleeding, leaking fluid, and decreased fetal movement    Anxiety during pregnancy in first trimester, antepartum ([facility])  - Following with Dr. Hare    Tetanus, diphtheria, and acellular pertussis (Tdap) vaccination declined  - Declined    Opioid use disorder  - 30 week growth scheduled    H/O shoulder dystocia in prior pregnancy, currently pregnant ([facility])  - Planning vaginal delivery    Factor V Leiden mutation affecting pregnancy (Multi)  - Doing well with lovenox  - 30 week growth scheduled    Hypoglycemia  - Requests glucose level checked after low value from glucola    Follow up in 2 weeks for return OB visit.    Pat Hawkins MD  Obstetrics & Gynecology  25

## 2025-07-19 LAB — GLUCOSE SERPL-MCNC: 94 MG/DL (ref 65–99)

## 2025-07-20 ENCOUNTER — TELEPHONE (OUTPATIENT)
Dept: BEHAVIORAL HEALTH | Facility: HOSPITAL | Age: 35
End: 2025-07-20
Payer: COMMERCIAL

## 2025-07-20 ENCOUNTER — DOCUMENTATION (OUTPATIENT)
Dept: BEHAVIORAL HEALTH | Facility: HOSPITAL | Age: 35
End: 2025-07-20

## 2025-07-20 NOTE — TELEPHONE ENCOUNTER
Maicor was paged at 11:41am on 7/20/25 with information regarding patient's prescription issues. Per chart review, patient was recently taken off of Clonazepam 2mg + 1mg at bedtime, but felt it did not sufficiently control her anxiety. Orders for 105 pills of clonazepam (2mg tid and 1mg prn at bedrime) was sent to her pharmacy on 7/18, but could not be filled as there was a hard stop on a previous order for clonazepam until 7/23, and patient needed the medications before then.      called the pharmacy and gave a verbal script to end the hard stop, and called the patient to let her know the prescription was ready for pick-up today. Writer informed patient to call the answering service again with any further questions, and to call [phone] with emergent concerns.

## 2025-07-21 ENCOUNTER — TELEPHONE (OUTPATIENT)
Dept: MATERNAL FETAL MEDICINE | Facility: HOSPITAL | Age: 35
End: 2025-07-21
Payer: COMMERCIAL

## 2025-07-21 NOTE — TELEPHONE ENCOUNTER
RN called the patient to discuss her message to Dr. Fierro. Patient name and  verified.   Patient completed her 1 hr glucose on on 7/10/25 with a value of 63. Patient states she was advised by Dr. Fierro to get into the office asap or go to triage for evaluation due to her results and symptoms (fatigue/dizziness). The patient is upset and concerned regarding her visit to the office on 25. Patient states she thought she was coming to address her blood sugar, but felt that wasn't the focus of her visit and was only ordered a glucose test after asking for it. Patient is upset because she states it was a fasting glucose, but she was never told to fast for the test. She had eaten breakfast and lunch prior to the having the lab drawn. Patient states this was the directive she was given. Patient feels the results are skewed and her hypoglycemia has not been addressed. Patient states she continues to eat small meals regularly throughout the day as advised and uses glucose tablets. Patient continues to endorse episodes of lethargy and dizziness. Patient does not want to see other providers in the office, only Dr. Fierro. She is looking for testing, diagnoses, and POC for her hypoglycemia. Patient assured I would forward her concerns to Dr. Fierro. Patient verbalized understanding. All questions and concerns addressed.  REJI Stewart

## 2025-07-22 ENCOUNTER — APPOINTMENT (OUTPATIENT)
Dept: HEMATOLOGY/ONCOLOGY | Facility: CLINIC | Age: 35
End: 2025-07-22
Payer: COMMERCIAL

## 2025-07-23 ENCOUNTER — HOSPITAL ENCOUNTER (OUTPATIENT)
Dept: RADIOLOGY | Facility: HOSPITAL | Age: 35
Discharge: HOME | End: 2025-07-23
Payer: COMMERCIAL

## 2025-07-23 DIAGNOSIS — D68.51 FACTOR V LEIDEN MUTATION AFFECTING PREGNANCY (MULTI): ICD-10-CM

## 2025-07-23 DIAGNOSIS — O99.119 FACTOR V LEIDEN MUTATION AFFECTING PREGNANCY (MULTI): ICD-10-CM

## 2025-07-23 PROCEDURE — 76816 OB US FOLLOW-UP PER FETUS: CPT

## 2025-07-25 ENCOUNTER — APPOINTMENT (OUTPATIENT)
Dept: HEMATOLOGY/ONCOLOGY | Facility: HOSPITAL | Age: 35
End: 2025-07-25
Payer: COMMERCIAL

## 2025-07-29 ENCOUNTER — ROUTINE PRENATAL (OUTPATIENT)
Dept: OBSTETRICS AND GYNECOLOGY | Facility: HOSPITAL | Age: 35
End: 2025-07-29
Payer: COMMERCIAL

## 2025-07-29 ENCOUNTER — TELEPHONE (OUTPATIENT)
Dept: BEHAVIORAL HEALTH | Facility: CLINIC | Age: 35
End: 2025-07-29

## 2025-07-29 VITALS — WEIGHT: 155 LBS | SYSTOLIC BLOOD PRESSURE: 122 MMHG | BODY MASS INDEX: 24.28 KG/M2 | DIASTOLIC BLOOD PRESSURE: 78 MMHG

## 2025-07-29 DIAGNOSIS — Z3A.31 31 WEEKS GESTATION OF PREGNANCY (HHS-HCC): ICD-10-CM

## 2025-07-29 DIAGNOSIS — O09.92 SUPERVISION OF HIGH RISK PREGNANCY IN SECOND TRIMESTER (HHS-HCC): Primary | ICD-10-CM

## 2025-07-29 PROCEDURE — 99214 OFFICE O/P EST MOD 30 MIN: CPT | Mod: TH | Performed by: STUDENT IN AN ORGANIZED HEALTH CARE EDUCATION/TRAINING PROGRAM

## 2025-07-29 PROCEDURE — 99214 OFFICE O/P EST MOD 30 MIN: CPT | Performed by: STUDENT IN AN ORGANIZED HEALTH CARE EDUCATION/TRAINING PROGRAM

## 2025-07-29 ASSESSMENT — SOCIAL DETERMINANTS OF HEALTH (SDOH)
WITHIN THE LAST YEAR, HAVE YOU BEEN KICKED, HIT, SLAPPED, OR OTHERWISE PHYSICALLY HURT BY YOUR PARTNER OR EX-PARTNER?: NO
WITHIN THE LAST YEAR, HAVE YOU BEEN AFRAID OF YOUR PARTNER OR EX-PARTNER?: NO
WITHIN THE LAST YEAR, HAVE TO BEEN RAPED OR FORCED TO HAVE ANY KIND OF SEXUAL ACTIVITY BY YOUR PARTNER OR EX-PARTNER?: NO
WITHIN THE LAST YEAR, HAVE YOU BEEN HUMILIATED OR EMOTIONALLY ABUSED IN OTHER WAYS BY YOUR PARTNER OR EX-PARTNER?: NO

## 2025-07-29 ASSESSMENT — PATIENT HEALTH QUESTIONNAIRE - PHQ9
SUM OF ALL RESPONSES TO PHQ9 QUESTIONS 1 & 2: 0
2. FEELING DOWN, DEPRESSED OR HOPELESS: NOT AT ALL
1. LITTLE INTEREST OR PLEASURE IN DOING THINGS: NOT AT ALL

## 2025-07-29 NOTE — PROGRESS NOTES
Ob Visit  25     SUBJECTIVE      HPI: Trinh Fiore is a 34 y.o.  at 31w6d here for RPNV.  She has no contractions, no bleeding, and no LOF. Reports normal fetal movement.  Patient reports significant neck pain, known herniated discs and toddler pulled on her neck this morning. Also needs assistance with medication refills.       OBJECTIVE  Visit Vitals  /78   Wt 70.3 kg (155 lb)   LMP 2024   BMI 24.28 kg/m²   OB Status Pregnant   Smoking Status Former   BSA 1.82 m²            ASSESSMENT & PLAN    Trinh Fiore is a 34 y.o.  at 31w6d here for the following concerns we addressed today:    Supervision of high risk pregnancy in second trimester ([facility])  - reviewed options for pain control - gabapentin has been helpful so she will continue to use this as needed  - she does affirm that physical therapy exercises also help and she is making efforts to do these despite discomfort  - confirms adequate supply of lovenox  - will communicate with my colleague Dr. Hare regarding additional prescription refills needed        RTC in 2 weeks      Peace Fierro MD

## 2025-07-31 DIAGNOSIS — F11.20: ICD-10-CM

## 2025-07-31 RX ORDER — BUPRENORPHINE HYDROCHLORIDE 8 MG/1
8 TABLET SUBLINGUAL 4 TIMES DAILY
Qty: 120 TABLET | Refills: 2 | Status: SHIPPED | OUTPATIENT
Start: 2025-07-31 | End: 2025-10-29

## 2025-08-03 ENCOUNTER — HOSPITAL ENCOUNTER (OUTPATIENT)
Facility: HOSPITAL | Age: 35
End: 2025-08-03
Attending: STUDENT IN AN ORGANIZED HEALTH CARE EDUCATION/TRAINING PROGRAM | Admitting: STUDENT IN AN ORGANIZED HEALTH CARE EDUCATION/TRAINING PROGRAM
Payer: COMMERCIAL

## 2025-08-03 ENCOUNTER — APPOINTMENT (OUTPATIENT)
Dept: RADIOLOGY | Facility: HOSPITAL | Age: 35
End: 2025-08-03
Payer: COMMERCIAL

## 2025-08-03 ENCOUNTER — HOSPITAL ENCOUNTER (OUTPATIENT)
Facility: HOSPITAL | Age: 35
Discharge: HOME | End: 2025-08-03
Attending: STUDENT IN AN ORGANIZED HEALTH CARE EDUCATION/TRAINING PROGRAM | Admitting: STUDENT IN AN ORGANIZED HEALTH CARE EDUCATION/TRAINING PROGRAM
Payer: COMMERCIAL

## 2025-08-03 VITALS
TEMPERATURE: 97 F | HEART RATE: 94 BPM | SYSTOLIC BLOOD PRESSURE: 93 MMHG | WEIGHT: 154.98 LBS | HEIGHT: 67 IN | DIASTOLIC BLOOD PRESSURE: 62 MMHG | RESPIRATION RATE: 18 BRPM | OXYGEN SATURATION: 98 % | BODY MASS INDEX: 24.33 KG/M2

## 2025-08-03 PROCEDURE — 2500000001 HC RX 250 WO HCPCS SELF ADMINISTERED DRUGS (ALT 637 FOR MEDICARE OP): Mod: SE | Performed by: NURSE PRACTITIONER

## 2025-08-03 PROCEDURE — 73630 X-RAY EXAM OF FOOT: CPT | Mod: LEFT SIDE | Performed by: RADIOLOGY

## 2025-08-03 PROCEDURE — S4991 NICOTINE PATCH NONLEGEND: HCPCS | Mod: SE | Performed by: NURSE PRACTITIONER

## 2025-08-03 PROCEDURE — 99215 OFFICE O/P EST HI 40 MIN: CPT

## 2025-08-03 PROCEDURE — 4500999001 HC ED NO CHARGE

## 2025-08-03 PROCEDURE — 2500000002 HC RX 250 W HCPCS SELF ADMINISTERED DRUGS (ALT 637 FOR MEDICARE OP, ALT 636 FOR OP/ED): Mod: SE | Performed by: NURSE PRACTITIONER

## 2025-08-03 PROCEDURE — 73630 X-RAY EXAM OF FOOT: CPT | Mod: LT

## 2025-08-03 RX ORDER — IBUPROFEN 200 MG
1 TABLET ORAL DAILY
Status: DISCONTINUED | OUTPATIENT
Start: 2025-08-03 | End: 2025-08-03 | Stop reason: HOSPADM

## 2025-08-03 RX ORDER — ACETAMINOPHEN 325 MG/1
975 TABLET ORAL ONCE
Status: COMPLETED | OUTPATIENT
Start: 2025-08-03 | End: 2025-08-03

## 2025-08-03 RX ORDER — BUPRENORPHINE HYDROCHLORIDE 8 MG/1
8 TABLET SUBLINGUAL ONCE
Status: COMPLETED | OUTPATIENT
Start: 2025-08-03 | End: 2025-08-03

## 2025-08-03 RX ORDER — MECOBALAMIN 1000 MCG
TABLET,CHEWABLE ORAL
COMMUNITY

## 2025-08-03 RX ORDER — LIDOCAINE HYDROCHLORIDE 10 MG/ML
0.5 INJECTION, SOLUTION INFILTRATION; PERINEURAL ONCE AS NEEDED
Status: DISCONTINUED | OUTPATIENT
Start: 2025-08-03 | End: 2025-08-03 | Stop reason: HOSPADM

## 2025-08-03 RX ORDER — IBUPROFEN 200 MG
1 TABLET ORAL EVERY 24 HOURS
COMMUNITY

## 2025-08-03 RX ORDER — CLONAZEPAM 0.5 MG/1
2 TABLET ORAL ONCE
Status: COMPLETED | OUTPATIENT
Start: 2025-08-03 | End: 2025-08-03

## 2025-08-03 RX ADMIN — NICOTINE 1 PATCH: 14 PATCH, EXTENDED RELEASE TRANSDERMAL at 20:41

## 2025-08-03 RX ADMIN — BUPRENORPHINE 8 MG: 8 TABLET SUBLINGUAL at 19:04

## 2025-08-03 RX ADMIN — CLONAZEPAM 2 MG: 0.5 TABLET ORAL at 19:04

## 2025-08-03 RX ADMIN — ACETAMINOPHEN 975 MG: 325 TABLET ORAL at 19:04

## 2025-08-03 SDOH — SOCIAL STABILITY: SOCIAL INSECURITY: DOES ANYONE TRY TO KEEP YOU FROM HAVING/CONTACTING OTHER FRIENDS OR DOING THINGS OUTSIDE YOUR HOME?: NO

## 2025-08-03 SDOH — HEALTH STABILITY: MENTAL HEALTH: NON-SPECIFIC ACTIVE SUICIDAL THOUGHTS (PAST 1 MONTH): NO

## 2025-08-03 SDOH — HEALTH STABILITY: MENTAL HEALTH: SUICIDAL BEHAVIOR (LIFETIME): NO

## 2025-08-03 SDOH — HEALTH STABILITY: MENTAL HEALTH: WERE YOU ABLE TO COMPLETE ALL THE BEHAVIORAL HEALTH SCREENINGS?: YES

## 2025-08-03 SDOH — HEALTH STABILITY: MENTAL HEALTH: WISH TO BE DEAD (PAST 1 MONTH): NO

## 2025-08-03 SDOH — SOCIAL STABILITY: SOCIAL INSECURITY: HAVE YOU HAD THOUGHTS OF HARMING ANYONE ELSE?: NO

## 2025-08-03 SDOH — SOCIAL STABILITY: SOCIAL INSECURITY: VERBAL ABUSE: YES, PAST (COMMENT)

## 2025-08-03 SDOH — SOCIAL STABILITY: SOCIAL INSECURITY: HAS ANYONE EVER THREATENED TO HURT YOUR FAMILY OR YOUR PETS?: NO

## 2025-08-03 SDOH — SOCIAL STABILITY: SOCIAL INSECURITY: ABUSE SCREEN: ADULT

## 2025-08-03 SDOH — SOCIAL STABILITY: SOCIAL INSECURITY: HAVE YOU HAD ANY THOUGHTS OF HARMING ANYONE ELSE?: NO

## 2025-08-03 SDOH — ECONOMIC STABILITY: HOUSING INSECURITY: DO YOU FEEL UNSAFE GOING BACK TO THE PLACE WHERE YOU ARE LIVING?: NO

## 2025-08-03 SDOH — SOCIAL STABILITY: SOCIAL INSECURITY: ARE YOU OR HAVE YOU BEEN THREATENED OR ABUSED PHYSICALLY, EMOTIONALLY, OR SEXUALLY BY ANYONE?: NO

## 2025-08-03 SDOH — HEALTH STABILITY: MENTAL HEALTH: HAVE YOU USED ANY SUBSTANCES (CANABIS, COCAINE, HEROIN, HALLUCINOGENS, INHALANTS, ETC.) IN THE PAST 12 MONTHS?: NO

## 2025-08-03 SDOH — HEALTH STABILITY: MENTAL HEALTH: HAVE YOU USED ANY PRESCRIPTION DRUGS OTHER THAN PRESCRIBED IN THE PAST 12 MONTHS?: NO

## 2025-08-03 SDOH — SOCIAL STABILITY: SOCIAL INSECURITY: ARE THERE ANY APPARENT SIGNS OF INJURIES/BEHAVIORS THAT COULD BE RELATED TO ABUSE/NEGLECT?: NO

## 2025-08-03 SDOH — SOCIAL STABILITY: SOCIAL INSECURITY: DO YOU FEEL ANYONE HAS EXPLOITED OR TAKEN ADVANTAGE OF YOU FINANCIALLY OR OF YOUR PERSONAL PROPERTY?: NO

## 2025-08-03 SDOH — SOCIAL STABILITY: SOCIAL INSECURITY: PHYSICAL ABUSE: YES, PAST (COMMENT)

## 2025-08-03 ASSESSMENT — PATIENT HEALTH QUESTIONNAIRE - PHQ9
2. FEELING DOWN, DEPRESSED OR HOPELESS: NOT AT ALL
1. LITTLE INTEREST OR PLEASURE IN DOING THINGS: NOT AT ALL
SUM OF ALL RESPONSES TO PHQ9 QUESTIONS 1 & 2: 0

## 2025-08-03 ASSESSMENT — LIFESTYLE VARIABLES
AUDIT-C TOTAL SCORE: 0
HOW OFTEN DO YOU HAVE 6 OR MORE DRINKS ON ONE OCCASION: NEVER
HOW MANY STANDARD DRINKS CONTAINING ALCOHOL DO YOU HAVE ON A TYPICAL DAY: PATIENT DOES NOT DRINK
SKIP TO QUESTIONS 9-10: 1
AUDIT-C TOTAL SCORE: 0
HOW OFTEN DO YOU HAVE A DRINK CONTAINING ALCOHOL: NEVER

## 2025-08-03 ASSESSMENT — PAIN - FUNCTIONAL ASSESSMENT: PAIN_FUNCTIONAL_ASSESSMENT: 0-10

## 2025-08-03 ASSESSMENT — PAIN SCALES - GENERAL
PAINLEVEL_OUTOF10: 8
PAINLEVEL_OUTOF10: 8

## 2025-08-03 NOTE — ED TRIAGE NOTES
CATHERINE Sep 2025, approximately 32wks, . Mechanical fall down 4 step, fell side ways/caught herself, but L big toe and foot are red, and limited motion. Denies LOC, Nausea, vomiting. PT reports she can feel baby moving.,

## 2025-08-03 NOTE — H&P
Triage H&P    Trinh Fiore is a 34 y.o. year old at 32w4d who presents to triage for   Chief Complaint   Patient presents with    Fall        Assessment/Plan:    Left foot injury s/p fall  -VSS  -Physical exam notable for swelling and bruising of left great toe  -No direct abd trauma  -TOCO: irregular contractions with irritability  -A + blood type  -Given Tylenol for pain  -Given Ice packs in triage   -Xray of foot ordered--> pending    Factor V Leiden  -On lovenox 40 mg daily    RAKESH  -On subutex 8mg 4x daily  -Missed 4pm dose, given in triage      Anxiety/Depression  -Missed Wellbutrin this AM, given in triage   -PM Klonopin dose given while in triage  -Has team she follows with outpatient     IUP at 32w4d   - Good fetal movement  - Continue routine prenatal care    Maternal Well-being  - Vital signs stable and WNL  - All questions and concerns addressed       Dispo  -Report given to Dr. Coffman for continuation of care    Plan and tracing reviewed with Dr. Larson.      JERE Leiva-CNP      Medical Problems       Problem List       Factor V Leiden mutation affecting pregnancy (Multi)    Overview Addendum 3/13/2025  5:26 AM by Peace Fierro MD   - heterozygous  - no personal history of VTE, only superficial phlebitis, but was treated with anticoagulation given superficial clot in context of PFO  - mom with TIA leading to diagnosis of factor V, had episodes of VTE as well  - per ACOG guidance as well as MFM consultation in 2022 discussed recommendation for prophylactic dosing of Lovenox with 40 mg daily         H/O shoulder dystocia in prior pregnancy, currently pregnant ([facility])    Overview Signed 7/18/2025 12:21 PM by Pat Hawkins MD   - Planning vaginal delivery         31 weeks gestation of pregnancy ([facility])    Overview Addendum 7/18/2025 12:20 PM by Pat Hawkins MD   Desired provider in labor: [] CNM  [x] Physician   [] Either Acceptable  [x] Blood Products: [x] Yes, accepts []  No, needs counseling  [x] Initial BMI: 20.40   [x] Prenatal Labs: non-immune to rubella and Hep B  [x] Cervical Cancer Screening: negative cytology 2025  [x] Rh status: positive  [x] Screen for IPV and Substance Use Risk: OUD- on Buprenorphine 8 mg QID  [x] Genetic Screening (cfDNA): WNL  [x] First Trimester Anatomy Screen (11-13.6 wks): 3/5, wnl  [x] Baby ASA (initiated): taking  [x] Pregnancy dated by: LMP c/w 11 wk US    [x] Anatomy US: (19-20 wks) WNL 25  [] Federal Sterilization consent signed (if indicated):  [x] 1hr GCT at 24-28wks:  [] Fetal Surveillance (if indicated): 30, 36 week growth  [x] Tdap (27-32 wks, may be given up to 36 wks if initial window missed): decline  [] RSV (32-36 wks) (Sept. 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 Expectant management:  [x] Mode of delivery ( anticipated ):          Patent foramen ovale ([facility])    History of cholestasis during pregnancy    Overview Signed 3/13/2025  5:32 AM by Peace Fierro MD   - experiencing generalized pruritus, discussed highly unlikely to be related to cholestasis, CMP and bile acids obtained for reassurance         Anxiety during pregnancy in first trimester, antepartum    Overview Signed 3/13/2025  5:30 AM by Peace Fierro MD   - increased symptomatology in the last year  - experiences panic attacks, also with increasing frequency recently  - current regimen Wellbutrin 300 mg daily, clonazepam 2 mg twice daily PRN (endorses consistent twice daily use, wants to wean), and Ativan 2 mg twice daily PRN (endorses consistent twice daily use, wants to wean)  - interested in referral to Dr. Hare, will place referral         Acute nasopharyngitis    Acute non-recurrent maxillary sinusitis    Cervical radiculopathy    Supervision of high risk pregnancy in second trimester ([facility])     Overview Addendum 2025  5:52 PM by Peace Fierro MD   - OUD in remission, on subutex  - factor V heterozygous  - anxiety/depression  - history of cholestasis  - PFO  - history of should dystocia         Opioid use disorder    Overview Addendum 2025 10:38 AM by Coreen May MD   On Subutex 8 mg QID for maintenance-  has been on a stable dose for a long time.   Has been clean since 2017  [facility] Rx for Narcan         Tetanus, diphtheria, and acellular pertussis (Tdap) vaccination declined    Hypoglycemia           Subjective   Trinh Fioer is a 34 y.o. year old at 32w4d who presents to triage for foot injury after fall. Patient states she tripped on a laundry basket at the top of her stairs and fell straight forward down 3 stairs. States her big toe got caught in one of the holes of the laundry basket when she fell forward and she felt a pull. Reports she caught herself by her hands. She did not hit her abdomen. States pain is all in her left big toe and top of her foot. Rating her pain a 8/10. States she cannot move her foot in any direction due to pain. Reports good fetal movement. No abdominal pain, vaginal symptoms, vaginal bleeding or loss of fluids.      OB History          8    Para   3    Term   2       1    AB   4    Living   3         SAB   4    IAB   0    Ectopic   0    Multiple   0    Live Births   3                  Surgical History[1]     Social History     Socioeconomic History    Marital status: Single     Spouse name: Not on file    Number of children: 3    Years of education: Not on file    Highest education level: Not on file   Occupational History    Not on file   Tobacco Use    Smoking status: Former     Current packs/day: 0.00     Average packs/day: 1 pack/day for 10.8 years (10.8 ttl pk-yrs)     Types: Cigarettes     Start date: 2007     Quit date: 10/7/2017     Years since quittin.8    Smokeless tobacco: Not on file    Tobacco comments:     Vape trying  to quit wirh nicotine pouch   Vaping Use    Vaping status: Every Day    Substances: Nicotine, Flavoring    Devices: Disposable, Pre-filled pod   Substance and Sexual Activity    Alcohol use: Not Currently     Comment: Sober-- even before pregnancy    Drug use: Not Currently     Comment: Sober since 8/6/17    Sexual activity: Not Currently     Partners: Male     Birth control/protection: None, Abstinence     Comment: One partner, planned to have another child eventually!   Other Topics Concern    Not on file   Social History Narrative    Not on file     Social Drivers of Health     Financial Resource Strain: High Risk (6/26/2025)    Overall Financial Resource Strain (CARDIA)     Difficulty of Paying Living Expenses: Very hard   Food Insecurity: No Food Insecurity (6/26/2025)    Hunger Vital Sign     Worried About Running Out of Food in the Last Year: Never true     Ran Out of Food in the Last Year: Never true   Transportation Needs: No Transportation Needs (6/26/2025)    PRAPARE - Transportation     Lack of Transportation (Medical): No     Lack of Transportation (Non-Medical): No   Physical Activity: Insufficiently Active (8/12/2024)    Received from [employer]    Exercise Vital Sign     On average, how many days per week do you engage in moderate to strenuous exercise (like a brisk walk)?: 2 days     On average, how many minutes do you engage in exercise at this level?: 40 min   Stress: No Stress Concern Present (8/12/2024)    Received from [employer]    [employer] - Occupational Stress Questionnaire     Feeling of Stress : Only a little   Social Connections: Moderately Integrated (8/12/2024)    Received from [employer]    Social Connection and Isolation Panel     In a typical week, how many times do you talk on the phone with family, friends, or neighbors?: More than three times a week     How often do you get together with friends or relatives?: More than three times a week     How  often do you attend Christian or Voodoo services?: More than 4 times per year     Do you belong to any clubs or organizations such as Christian groups, unions, fraternal or athletic groups, or school groups?: Yes     How often do you attend meetings of the clubs or organizations you belong to?: More than 4 times per year     Are you , , , , never , or living with a partner?: Never    Intimate Partner Violence: Not At Risk (7/29/2025)    Humiliation, Afraid, Rape, and Kick questionnaire     Fear of Current or Ex-Partner: No     Emotionally Abused: No     Physically Abused: No     Sexually Abused: No        RX Allergies[2]     Prescriptions Prior to Admission[3]     Objective     Visit Vitals  /77   Pulse 90   Temp 36.6 °C (97.9 °F) (Temporal)   Resp 20          Physical Exam    Eyes:      Extraocular Movements: Extraocular movements intact.      Pupils: Pupils are equal, round, and reactive to light.       Cardiovascular:      Rate and Rhythm: Normal rate and regular rhythm.   Pulmonary:      Effort: Pulmonary effort is normal.      Breath sounds: Normal breath sounds.   Abdominal:      Palpations: Abdomen is soft.      Tenderness: There is no abdominal tenderness.     Musculoskeletal:      Right ankle: Normal.      Left ankle: Normal.      Right foot: Normal.      Left foot: Normal capillary refill. Swelling and bony tenderness present. Normal pulse.      Comments: Positive swelling and bruising noted on left big toe     Skin:     General: Skin is warm and dry.      Capillary Refill: Capillary refill takes less than 2 seconds.     Neurological:      General: No focal deficit present.      Mental Status: She is alert and oriented to person, place, and time.     Psychiatric:         Mood and Affect: Mood normal.         Behavior: Behavior normal.        NST  Non-Stress Test   Baseline Fetal Heart Rate for Non-Stress Test: 140 BPM  Variability in Waveform for Non-Stress  Test: Moderate  Accelerations in Non-Stress Test: No  Decelerations in Non-Stress Test: None  Contractions in Non-Stress Test: Irregular  NST Contraction Frequency: 8-9 mins  Interpretation of Non-Stress Test   Interpretation of Non-Stress Test: (!) Non-reactive       Labs  Labs in chart were reviewed.               [1]   Past Surgical History:  Procedure Laterality Date    MR HEAD ANGIO WO IV CONTRAST  2/26/2017    MR HEAD ANGIO WO IV CONTRAST 2/26/2017 [facility]    MR NECK ANGIO WO IV CONTRAST  2/26/2017    MR NECK ANGIO WO IV CONTRAST 2/26/2017 [facility]ICAL [facility]GAC[facility]    OTHER SURGICAL HISTORY  03/07/2017    Biopsy Bone Marrow    OTHER SURGICAL HISTORY  01/12/2022    Colonoscopy    OTHER SURGICAL HISTORY  01/12/2022    Dilation and curettage    OTHER SURGICAL HISTORY  01/12/2022    Laparoscopy    OTHER SURGICAL HISTORY  01/12/2022    Nasal septoplasty   [2]   Allergies  Allergen Reactions    Prednisone Cardiac arrhythmia/arrest, Palpitations and Shortness of breath    Cefdinir Diarrhea, Dizziness and GI Upset    Naltrexone Confusion and Psychosis     Amnesia after receiving Vivitrol injection in 2018   [3]   Medications Prior to Admission   Medication Sig Dispense Refill Last Dose/Taking    aspirin 81 mg chewable tablet Chew 1 tablet (81 mg) once daily. 30 tablet 11 8/3/2025 Morning    buprenorphine (Subtex) 8 mg Place 1 tablet (8 mg) under the tongue 4 times a day. 120 tablet 2 8/3/2025 at 12:00 PM    buPROPion XL (Wellbutrin XL) 150 mg 24 hr tablet Take 1 tablet (150 mg) by mouth once daily in the morning. 30 tablet 11 8/3/2025 Morning    clonazePAM (KlonoPIN) 2 mg tablet May take 1 tablet (2 mg) by mouth 3 times a day as needed for anxiety. May also take 0.5 tablets (1 mg) as needed at bedtime for anxiety. 105 tablet 0 8/3/2025 Noon    enoxaparin (Lovenox) 40 mg/0.4 mL syringe Inject 0.4 mL (40 mg) under the skin once daily. 90 each 3 8/2/2025 Bedtime    [id number] 5,000 mcg-1,360  mcg DFE-2.5 mg tablet,chewable Chew and swallow.       naloxone (Narcan) 4 mg/0.1 mL nasal spray Administer 1 spray (4 mg) into affected nostril(s) if needed for opioid reversal. May repeat every 2-3 minutes if needed, alternating nostrils, until medical assistance becomes available. 2 each 0     nicotine (Nicoderm CQ) 21 mg/24 hr patch Place 1 patch on the skin once every 24 hours.       prenatal 115/iron/folic acid (PRENATAL 19 ORAL) Take 1 tablet by mouth.       propranolol (Inderal) 10 mg tablet Take 1 tablet (10 mg) by mouth 2 times a day as needed (panic attacks). 60 tablet 2

## 2025-08-07 NOTE — ASSESSMENT & PLAN NOTE
- reviewed options for pain control - gabapentin has been helpful so she will continue to use this as needed  - she does affirm that physical therapy exercises also help and she is making efforts to do these despite discomfort  - confirms adequate supply of lovenox  - will communicate with my colleague Dr. Hare regarding additional prescription refills needed

## 2025-08-09 ENCOUNTER — DOCUMENTATION (OUTPATIENT)
Dept: OBSTETRICS AND GYNECOLOGY | Facility: HOSPITAL | Age: 35
End: 2025-08-09
Payer: COMMERCIAL

## 2025-08-10 ENCOUNTER — HOSPITAL ENCOUNTER (OUTPATIENT)
Facility: HOSPITAL | Age: 35
End: 2025-08-10
Attending: STUDENT IN AN ORGANIZED HEALTH CARE EDUCATION/TRAINING PROGRAM | Admitting: STUDENT IN AN ORGANIZED HEALTH CARE EDUCATION/TRAINING PROGRAM
Payer: COMMERCIAL

## 2025-08-10 ENCOUNTER — HOSPITAL ENCOUNTER (OUTPATIENT)
Facility: HOSPITAL | Age: 35
Discharge: HOME | End: 2025-08-10
Attending: STUDENT IN AN ORGANIZED HEALTH CARE EDUCATION/TRAINING PROGRAM | Admitting: STUDENT IN AN ORGANIZED HEALTH CARE EDUCATION/TRAINING PROGRAM
Payer: COMMERCIAL

## 2025-08-10 VITALS
DIASTOLIC BLOOD PRESSURE: 83 MMHG | RESPIRATION RATE: 16 BRPM | HEART RATE: 116 BPM | SYSTOLIC BLOOD PRESSURE: 128 MMHG | TEMPERATURE: 97.9 F | OXYGEN SATURATION: 99 %

## 2025-08-10 DIAGNOSIS — K59.00 CONSTIPATION DURING PREGNANCY IN THIRD TRIMESTER (HHS-HCC): Primary | ICD-10-CM

## 2025-08-10 DIAGNOSIS — O99.613 CONSTIPATION DURING PREGNANCY IN THIRD TRIMESTER (HHS-HCC): Primary | ICD-10-CM

## 2025-08-10 LAB
APPEARANCE UR: CLEAR
BILIRUB UR STRIP.AUTO-MCNC: NEGATIVE MG/DL
COLOR UR: YELLOW
GLUCOSE UR STRIP.AUTO-MCNC: NORMAL MG/DL
KETONES UR STRIP.AUTO-MCNC: NEGATIVE MG/DL
LEUKOCYTE ESTERASE UR QL STRIP.AUTO: ABNORMAL
MUCOUS THREADS #/AREA URNS AUTO: NORMAL /LPF
NITRITE UR QL STRIP.AUTO: NEGATIVE
PH UR STRIP.AUTO: 6 [PH]
POC APPEARANCE, URINE: CLEAR
POC BILIRUBIN, URINE: NEGATIVE
POC BLOOD, URINE: NEGATIVE
POC COLOR, URINE: YELLOW
POC GLUCOSE, URINE: NEGATIVE MG/DL
POC KETONES, URINE: NEGATIVE MG/DL
POC LEUKOCYTES, URINE: NEGATIVE
POC NITRITE,URINE: NEGATIVE
POC PH, URINE: 5.5 PH
POC PROTEIN, URINE: NEGATIVE MG/DL
POC SPECIFIC GRAVITY, URINE: >=1.03
POC UROBILINOGEN, URINE: 0.2 EU/DL
PROT UR STRIP.AUTO-MCNC: NEGATIVE MG/DL
RBC # UR STRIP.AUTO: NEGATIVE MG/DL
RBC #/AREA URNS AUTO: NORMAL /HPF
SP GR UR STRIP.AUTO: 1.02
SQUAMOUS #/AREA URNS AUTO: NORMAL /HPF
UROBILINOGEN UR STRIP.AUTO-MCNC: NORMAL MG/DL
WBC #/AREA URNS AUTO: NORMAL /HPF

## 2025-08-10 PROCEDURE — 59025 FETAL NON-STRESS TEST: CPT

## 2025-08-10 PROCEDURE — 99215 OFFICE O/P EST HI 40 MIN: CPT

## 2025-08-10 PROCEDURE — 2500000002 HC RX 250 W HCPCS SELF ADMINISTERED DRUGS (ALT 637 FOR MEDICARE OP, ALT 636 FOR OP/ED): Mod: SE

## 2025-08-10 PROCEDURE — 81001 URINALYSIS AUTO W/SCOPE: CPT

## 2025-08-10 PROCEDURE — 2500000001 HC RX 250 WO HCPCS SELF ADMINISTERED DRUGS (ALT 637 FOR MEDICARE OP): Mod: SE | Performed by: STUDENT IN AN ORGANIZED HEALTH CARE EDUCATION/TRAINING PROGRAM

## 2025-08-10 PROCEDURE — 2500000002 HC RX 250 W HCPCS SELF ADMINISTERED DRUGS (ALT 637 FOR MEDICARE OP, ALT 636 FOR OP/ED): Mod: SE | Performed by: STUDENT IN AN ORGANIZED HEALTH CARE EDUCATION/TRAINING PROGRAM

## 2025-08-10 PROCEDURE — 99214 OFFICE O/P EST MOD 30 MIN: CPT

## 2025-08-10 PROCEDURE — 87491 CHLMYD TRACH DNA AMP PROBE: CPT

## 2025-08-10 PROCEDURE — 2500000001 HC RX 250 WO HCPCS SELF ADMINISTERED DRUGS (ALT 637 FOR MEDICARE OP): Mod: SE

## 2025-08-10 PROCEDURE — 4500999001 HC ED NO CHARGE

## 2025-08-10 PROCEDURE — 87661 TRICHOMONAS VAGINALIS AMPLIF: CPT

## 2025-08-10 PROCEDURE — 87205 SMEAR GRAM STAIN: CPT

## 2025-08-10 PROCEDURE — S4991 NICOTINE PATCH NONLEGEND: HCPCS | Mod: SE

## 2025-08-10 PROCEDURE — 87086 URINE CULTURE/COLONY COUNT: CPT

## 2025-08-10 PROCEDURE — 81002 URINALYSIS NONAUTO W/O SCOPE: CPT

## 2025-08-10 RX ORDER — SYRING-NEEDL,DISP,INSUL,0.3 ML 29 G X1/2"
296 SYRINGE, EMPTY DISPOSABLE MISCELLANEOUS ONCE
Qty: 296 ML | Refills: 0 | Status: SHIPPED | OUTPATIENT
Start: 2025-08-10 | End: 2025-08-10

## 2025-08-10 RX ORDER — BUPRENORPHINE HYDROCHLORIDE 8 MG/1
8 TABLET SUBLINGUAL ONCE
Status: COMPLETED | OUTPATIENT
Start: 2025-08-10 | End: 2025-08-10

## 2025-08-10 RX ORDER — IBUPROFEN 200 MG
1 TABLET ORAL ONCE
Status: DISCONTINUED | OUTPATIENT
Start: 2025-08-10 | End: 2025-08-10 | Stop reason: HOSPADM

## 2025-08-10 RX ORDER — CYCLOBENZAPRINE HCL 10 MG
10 TABLET ORAL ONCE
Status: COMPLETED | OUTPATIENT
Start: 2025-08-10 | End: 2025-08-10

## 2025-08-10 RX ORDER — ADHESIVE BANDAGE
30 BANDAGE TOPICAL ONCE
Status: COMPLETED | OUTPATIENT
Start: 2025-08-10 | End: 2025-08-10

## 2025-08-10 RX ORDER — ACETAMINOPHEN 325 MG/1
975 TABLET ORAL ONCE
Status: COMPLETED | OUTPATIENT
Start: 2025-08-10 | End: 2025-08-10

## 2025-08-10 RX ORDER — CLONAZEPAM 0.5 MG/1
2 TABLET ORAL ONCE
Status: COMPLETED | OUTPATIENT
Start: 2025-08-10 | End: 2025-08-10

## 2025-08-10 RX ADMIN — CLONAZEPAM 2 MG: 0.5 TABLET ORAL at 17:13

## 2025-08-10 RX ADMIN — CYCLOBENZAPRINE 10 MG: 10 TABLET, FILM COATED ORAL at 17:13

## 2025-08-10 RX ADMIN — MAGNESIUM HYDROXIDE 30 ML: 400 SUSPENSION ORAL at 19:43

## 2025-08-10 RX ADMIN — NICOTINE 1 PATCH: 21 PATCH, EXTENDED RELEASE TRANSDERMAL at 18:14

## 2025-08-10 RX ADMIN — ACETAMINOPHEN 975 MG: 325 TABLET ORAL at 17:12

## 2025-08-10 RX ADMIN — BUPRENORPHINE 8 MG: 8 TABLET SUBLINGUAL at 17:13

## 2025-08-10 SDOH — SOCIAL STABILITY: SOCIAL INSECURITY: ABUSE SCREEN: ADULT

## 2025-08-10 SDOH — SOCIAL STABILITY: SOCIAL INSECURITY: HAVE YOU HAD THOUGHTS OF HARMING ANYONE ELSE?: NO

## 2025-08-10 SDOH — SOCIAL STABILITY: SOCIAL INSECURITY: ARE THERE ANY APPARENT SIGNS OF INJURIES/BEHAVIORS THAT COULD BE RELATED TO ABUSE/NEGLECT?: NO

## 2025-08-10 SDOH — SOCIAL STABILITY: SOCIAL INSECURITY: DO YOU FEEL ANYONE HAS EXPLOITED OR TAKEN ADVANTAGE OF YOU FINANCIALLY OR OF YOUR PERSONAL PROPERTY?: NO

## 2025-08-10 SDOH — SOCIAL STABILITY: SOCIAL INSECURITY: PHYSICAL ABUSE: DENIES

## 2025-08-10 SDOH — HEALTH STABILITY: MENTAL HEALTH: SUICIDAL BEHAVIOR (LIFETIME): NO

## 2025-08-10 SDOH — HEALTH STABILITY: MENTAL HEALTH: WISH TO BE DEAD (PAST 1 MONTH): NO

## 2025-08-10 SDOH — HEALTH STABILITY: MENTAL HEALTH: HAVE YOU USED ANY SUBSTANCES (CANABIS, COCAINE, HEROIN, HALLUCINOGENS, INHALANTS, ETC.) IN THE PAST 12 MONTHS?: NO

## 2025-08-10 SDOH — HEALTH STABILITY: MENTAL HEALTH: NON-SPECIFIC ACTIVE SUICIDAL THOUGHTS (PAST 1 MONTH): NO

## 2025-08-10 SDOH — HEALTH STABILITY: MENTAL HEALTH: HAVE YOU USED ANY PRESCRIPTION DRUGS OTHER THAN PRESCRIBED IN THE PAST 12 MONTHS?: NO

## 2025-08-10 SDOH — SOCIAL STABILITY: SOCIAL INSECURITY: DOES ANYONE TRY TO KEEP YOU FROM HAVING/CONTACTING OTHER FRIENDS OR DOING THINGS OUTSIDE YOUR HOME?: NO

## 2025-08-10 SDOH — SOCIAL STABILITY: SOCIAL INSECURITY: ARE YOU OR HAVE YOU BEEN THREATENED OR ABUSED PHYSICALLY, EMOTIONALLY, OR SEXUALLY BY ANYONE?: NO

## 2025-08-10 SDOH — SOCIAL STABILITY: SOCIAL INSECURITY: VERBAL ABUSE: DENIES

## 2025-08-10 SDOH — SOCIAL STABILITY: SOCIAL INSECURITY: HAVE YOU HAD ANY THOUGHTS OF HARMING ANYONE ELSE?: NO

## 2025-08-10 SDOH — HEALTH STABILITY: MENTAL HEALTH: WERE YOU ABLE TO COMPLETE ALL THE BEHAVIORAL HEALTH SCREENINGS?: YES

## 2025-08-10 SDOH — SOCIAL STABILITY: SOCIAL INSECURITY: HAS ANYONE EVER THREATENED TO HURT YOUR FAMILY OR YOUR PETS?: NO

## 2025-08-10 SDOH — ECONOMIC STABILITY: HOUSING INSECURITY: DO YOU FEEL UNSAFE GOING BACK TO THE PLACE WHERE YOU ARE LIVING?: NO

## 2025-08-10 ASSESSMENT — LIFESTYLE VARIABLES
AUDIT-C TOTAL SCORE: 0
SKIP TO QUESTIONS 9-10: 1
HOW OFTEN DO YOU HAVE 6 OR MORE DRINKS ON ONE OCCASION: NEVER
HOW OFTEN DO YOU HAVE A DRINK CONTAINING ALCOHOL: NEVER
HOW MANY STANDARD DRINKS CONTAINING ALCOHOL DO YOU HAVE ON A TYPICAL DAY: PATIENT DOES NOT DRINK
AUDIT-C TOTAL SCORE: 0

## 2025-08-10 ASSESSMENT — PAIN SCALES - GENERAL
PAINLEVEL_OUTOF10: 5 - MODERATE PAIN

## 2025-08-10 ASSESSMENT — PAIN DESCRIPTION - LOCATION: LOCATION: ABDOMEN

## 2025-08-10 ASSESSMENT — PAIN - FUNCTIONAL ASSESSMENT: PAIN_FUNCTIONAL_ASSESSMENT: 0-10

## 2025-08-10 NOTE — PROGRESS NOTES
Return call from answering service page. Patient reporting  contractions that have subsided with rest. Patient counseled on return precautions in the event the  contractions returned. Discussed presenting to LnD triage if contractions continued. Patient aware of plan.    Silverio Lauren MD  Fellow, Maternal Fetal Medicine

## 2025-08-10 NOTE — H&P
OB Triage H&P    Assessment/Plan    Trinh Fiore is a 34 y.o.  at 33w4d, CATHERINE: 2025, by Last Menstrual Period, who presents to triage with contractions.    Plan      Contractions, Constipation  - /4-> unchanged on 2 hr recheck, stool palpated on cervical exam  - [place]: ctx irregular  - SSE: WNL, no signs of infection  - Urine dip n/f SG >= 1.030, leuks  - Tylenol and flexeril given with no improvement, followed by milk of magnesia  - Patient PO hydrated  - Urine culture, gram stain, GC/CT/trich pending, will notify if positive  - Magnesium citrate sent to pharmacy in case needed for continued constipation  - Recommend bowel regimen of miralax 1-2 times daily  - Low s/f PTL at this time    IUP @ 33.4 wga  -Fetal monitoring reassuring, NST reactive  -Good fetal movement  -Up to date on prenatal care  -Continue routine prenatal care    Dispo  -Patient appropriate for discharge home, agrees with plan  -Return precautions discussed   -Follow up at next scheduled OB appointment or to triage sooner as needed    Discussed plan and reviewed with: Dr. May    Pregnancy Problems (from 25 to present)       Problem Noted Diagnosed Resolved    Supervision of high risk pregnancy in second trimester ([facility]) 2025 by Peace Fierro MD  No    Priority:  Medium       Overview Addendum 2025  5:52 PM by Peace Fierro MD   - OUD in remission, on subutex  - factor V heterozygous  - anxiety/depression  - history of cholestasis  - PFO  - history of should dystocia         Anxiety during pregnancy in first trimester, antepartum 3/13/2025 by Peace Fierro MD  No    Priority:  Medium       Overview Signed 3/13/2025  5:30 AM by Peace Fierro MD   - increased symptomatology in the last year  - experiences panic attacks, also with increasing frequency recently  - current regimen Wellbutrin 300 mg daily, clonazepam 2 mg twice daily PRN (endorses consistent twice daily use, wants to wean), and Ativan 2 mg  twice daily PRN (endorses consistent twice daily use, wants to wean)  - interested in referral to Dr. Hare, will place referral         Patent foramen ovale ([facility]) 3/11/2025 by Peace Fierro MD  No    Priority:  Medium       History of cholestasis during pregnancy 3/11/2025 by Peace Fierro MD  No    Priority:  Medium       Overview Signed 3/13/2025  5:32 AM by Peace Fierro MD   - experiencing generalized pruritus, discussed highly unlikely to be related to cholestasis, CMP and bile acids obtained for reassurance         Factor V Leiden mutation affecting pregnancy ([facility]) 2/13/2025 by Wilfrido Méndez, DO  No    Priority:  Medium       Overview Addendum 3/13/2025  5:26 AM by Peace Fierro MD   - heterozygous  - no personal history of VTE, only superficial phlebitis, but was treated with anticoagulation given superficial clot in context of PFO  - mom with TIA leading to diagnosis of factor V, had episodes of VTE as well  - per ACOG guidance as well as MFM consultation in 2022 discussed recommendation for prophylactic dosing of Lovenox with 40 mg daily         H/O shoulder dystocia in prior pregnancy, currently pregnant ([facility]) 2/13/2025 by Wilfrido Méndez, DO  No    Priority:  Medium       Overview Signed 7/18/2025 12:21 PM by Pat Hawkins MD   - Planning vaginal delivery         31 weeks gestation of pregnancy ([facility]) 2/13/2025 by Wilfrido Médnez, DO  No    Priority:  Medium       Overview Addendum 7/18/2025 12:20 PM by Pat Hawkins MD   Desired provider in labor: [] CNM  [x] Physician   [] Either Acceptable  [x] Blood Products: [x] Yes, accepts [] No, needs counseling  [x] Initial BMI: 20.40   [x] Prenatal Labs: non-immune to rubella and Hep B  [x] Cervical Cancer Screening: negative cytology 2/2025  [x] Rh status: positive  [x] Screen for IPV and Substance Use Risk: OUD- on Buprenorphine 8 mg QID  [x] Genetic Screening (cfDNA): WNL  [x] First Trimester Anatomy Screen (11-13.6 wks): 3/5, wnl  [x]  Baby ASA (initiated): taking  [x] Pregnancy dated by: LMP c/w 11 wk US    [x] Anatomy US: (19-20 wks) WNL 25  [] Federal Sterilization consent signed (if indicated):  [x] 1hr GCT at 24-28wks:  [] Fetal Surveillance (if indicated): 30, 36 week growth  [x] Tdap (27-32 wks, may be given up to 36 wks if initial window missed): decline  [] RSV (32-36 wks) (Sept. 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 Expectant management:  [x] Mode of delivery ( anticipated ):          Substance dependence during pregnancy, antepartum ([facility]) 3/11/2025 by Peace Fierro MD  2025 by Peace Fierro MD    Overview Addendum 3/13/2025  5:21 AM by Peace Fierro MD   - in recovery  - sober of opiates since 2018  - continues on maintenance therapy with Subutex 8 mg recently uptitrated to four times daily  - interested in referral to [facility], will refer for shared care           History of  delivery, currently pregnant in first trimester ([facility]) 3/11/2025 by Peace Fierro MD  3/11/2025 by Peace Fierro MD    Overview Signed 3/11/2025 10:42 AM by Peace Fierro MD   - 36 wga         Prior pregnancy with placenta abruption in first trimester, antepartum ([facility]) 2025 by Wilfrido Méndez, DO  2025 by Peace Fierro MD            Subjective   Good fetal movement.  Denies vaginal bleeding., Denies leaking of fluid.      Contractions started yesterday morning. She had interrupted sleep overnight 2/2 contractions. Now increased frequency, Q4-5min lasting 45-60 seconds, wrap around to her back, rated 5/10. Lying on her left side and drinking water helps the contractions, ambulation makes them worse. Denies vaginal symptoms of infection. Denies back pain, fever/chills, dysuria, urinary frequency. She reports constipation recently, but had a normal BM yesterday.  She's been taking colace which she feels helped.    Prenatal Provider Dr. Fierro    OB History    Para Term  AB Living   8 3 2 1 4 3   SAB IAB Ectopic Multiple Live Births   4 0 0 0 3      # Outcome Date GA Lbr Danny/2nd Weight Sex Type Anes PTL Lv   8 Current            7 Term 23 37w0d  3.402 kg M Vag-Spont EPI  DEWAYNE      Complications: Shoulder Dystocia   6  20 36w0d  3.005 kg M Vag-Spont EPI N DEWAYNE   5 Term 13 37w0d  3.742 kg M Vag-Spont EPI N DEWAYNE      Complications: Placenta abruptio ([facility])   4 SAB            3 SAB            2 SAB            1 SAB                Surgical History[1]    Social History     Tobacco Use    Smoking status: Former     Current packs/day: 0.00     Average packs/day: 1 pack/day for 10.8 years (10.8 ttl pk-yrs)     Types: Cigarettes     Start date: 2007     Quit date: 10/7/2017     Years since quittin.8    Smokeless tobacco: Not on file    Tobacco comments:     Vape trying to quit [facility] nicotine pouch   Substance Use Topics    Alcohol use: Not Currently     Comment: Sober-- even before pregnancy       Allergies[2]    Prescriptions Prior to Admission[3]  Objective     Last Vitals  Temp Pulse Resp BP MAP O2 Sat   36.2 °C (97.2 °F) (!) 116 16 128/83 100 99 %     Blood Pressures         8/10/2025  1545 8/10/2025  1611          BP: 123/75 128/83               Physical Exam  General: NAD, mood appropriate  Cardiopulmonary: warm and well perfused, breathing comfortably on room air  Abdomen: Gravid, non-tender. Tenderness with palpation of pubic symphysis  Extremities: Symmetric  Speculum Exam: Vaginal tissue and discharge WNL, no cervical lesions or purulent drainage  Cervix:  /Ballotable     Chaperone Present: Yes.  Chaperone Name/Title: Tawana Khoury RN  Examination Chaperoned: Gynecological Exam     Fetal Monitoring  Baseline: 145-> 140 bpm, Variability: moderate,  Accelerations: present and Decelerations: none  Uterine Activity: irregular  ctx  Interpretation: Category I and Reactive    Bedside ultrasound: Yes, cephalic on BSUS    Admission on 08/10/2025   Component Date Value Ref Range Status    POC Color, Urine 08/10/2025 Yellow  Straw, Yellow, Light-Yellow Final    POC Appearance, Urine 08/10/2025 Clear  Clear Final    POC Glucose, Urine 08/10/2025 NEGATIVE  NEGATIVE mg/dl Final    POC Bilirubin, Urine 08/10/2025 NEGATIVE  NEGATIVE Final    POC Ketones, Urine 08/10/2025 NEGATIVE  NEGATIVE mg/dl Final    POC Specific Gravity, Urine 08/10/2025 >=1.030  1.005 - 1.035 Final    POC Blood, Urine 08/10/2025 NEGATIVE  NEGATIVE Final    POC PH, Urine 08/10/2025 5.5  No Reference Range Established PH Final    POC Protein, Urine 08/10/2025 NEGATIVE  NEGATIVE mg/dl Final    POC Urobilinogen, Urine 08/10/2025 0.2  0.2, 1.0 EU/DL Final    Poc Nitrite, Urine 08/10/2025 NEGATIVE  NEGATIVE Final    POC Leukocytes, Urine 08/10/2025 NEGATIVE  NEGATIVE Final    Color, Urine 08/10/2025 Yellow  Light-Yellow, Yellow, Dark-Yellow Final    Appearance, Urine 08/10/2025 Clear  Clear Final    Specific Gravity, Urine 08/10/2025 1.025  1.005 - 1.035 Final    pH, Urine 08/10/2025 6.0  5.0, 5.5, 6.0, 6.5, 7.0, 7.5, 8.0 Final    Protein, Urine 08/10/2025 NEGATIVE  NEGATIVE, 10 (TRACE), 20 (TRACE) mg/dL Final    Glucose, Urine 08/10/2025 Normal  Normal mg/dL Final    Blood, Urine 08/10/2025 NEGATIVE  NEGATIVE mg/dL Final    Ketones, Urine 08/10/2025 NEGATIVE  NEGATIVE mg/dL Final    Bilirubin, Urine 08/10/2025 NEGATIVE  NEGATIVE mg/dL Final    Urobilinogen, Urine 08/10/2025 Normal  Normal mg/dL Final    Nitrite, Urine 08/10/2025 NEGATIVE  NEGATIVE Final    Leukocyte Esterase, Urine 08/10/2025 25 Princess/uL (A)  NEGATIVE Final    WBC, Urine 08/10/2025 1-5  1-5, NONE /HPF Final    RBC, Urine 08/10/2025 1-2  NONE, 1-2, 3-5 /HPF Final    Squamous Epithelial Cells, Urine 08/10/2025 1-9 (SPARSE)  Reference range not established. /HPF Final    Mucus, Urine 08/10/2025 FEW  Reference range not  established. /LPF Final                         [1]   Past Surgical History:  Procedure Laterality Date    MR HEAD ANGIO WO IV CONTRAST  2/26/2017    MR HEAD ANGIO WO IV CONTRAST 2/26/2017 [facility] CLINICAL [facility]GAC[facility]    MR NECK ANGIO WO IV CONTRAST  2/26/2017    MR NECK ANGIO WO IV CONTRAST 2/26/2017 [facility] CLINICAL LEGACY    OTHER SURGICAL HISTORY  03/07/2017    Biopsy Bone Marrow    OTHER SURGICAL HISTORY  01/12/2022    Colonoscopy    OTHER SURGICAL HISTORY  01/12/2022    Dilation and curettage    OTHER SURGICAL HISTORY  01/12/2022    Laparoscopy    OTHER SURGICAL HISTORY  01/12/2022    Nasal septoplasty   [2]   Allergies  Allergen Reactions    Prednisone Cardiac arrhythmia/arrest, Palpitations and Shortness of breath    Cefdinir Diarrhea, Dizziness and GI Upset    Naltrexone Confusion and Psychosis     Amnesia after receiving Vivitrol injection in 2018   [3]   Medications Prior to Admission   Medication Sig Dispense Refill Last Dose/Taking    aspirin 81 mg chewable tablet Chew 1 tablet (81 mg) once daily. 30 tablet 11 8/9/2025 Evening    buprenorphine (Subtex) 8 mg Place 1 tablet (8 mg) under the tongue 4 times a day. 120 tablet 2 8/10/2025    clonazePAM (KlonoPIN) 2 mg tablet May take 1 tablet (2 mg) by mouth 3 times a day as needed for anxiety. May also take 0.5 tablets (1 mg) as needed at bedtime for anxiety. 105 tablet 0 8/10/2025    enoxaparin (Lovenox) 40 mg/0.4 mL syringe Inject 0.4 mL (40 mg) under the skin once daily. 90 each 3 Past Week    nicotine (Nicoderm CQ) 21 mg/24 hr patch Place 1 patch on the skin once every 24 hours.   8/9/2025    prenatal 115/iron/folic acid (PRENATAL 19 ORAL) Take 1 tablet by mouth.   8/10/2025    propranolol (Inderal) 10 mg tablet Take 1 tablet (10 mg) by mouth 2 times a day as needed (panic attacks). 60 tablet 2 More than a month    [id number] 5,000 mcg-1,360 mcg DFE-2.5 mg tablet,chewable Chew and swallow.       buPROPion XL (Wellbutrin XL) 150 mg 24 hr tablet  Take 1 tablet (150 mg) by mouth once daily in the morning. 30 tablet 11     naloxone (Narcan) 4 mg/0.1 mL nasal spray Administer 1 spray (4 mg) into affected nostril(s) if needed for opioid reversal. May repeat every 2-3 minutes if needed, alternating nostrils, until medical assistance becomes available. 2 each 0

## 2025-08-11 ENCOUNTER — RESULTS FOLLOW-UP (OUTPATIENT)
Dept: OBSTETRICS AND GYNECOLOGY | Facility: HOSPITAL | Age: 35
End: 2025-08-11
Payer: COMMERCIAL

## 2025-08-11 DIAGNOSIS — N89.8 VAGINAL DISCHARGE DURING PREGNANCY IN THIRD TRIMESTER (HHS-HCC): Primary | ICD-10-CM

## 2025-08-11 DIAGNOSIS — O26.893 VAGINAL DISCHARGE DURING PREGNANCY IN THIRD TRIMESTER (HHS-HCC): Primary | ICD-10-CM

## 2025-08-11 LAB
BACTERIA UR CULT: NO GROWTH
C TRACH RRNA SPEC QL NAA+PROBE: NEGATIVE
CLUE CELLS VAG LPF-#/AREA: ABNORMAL /[LPF]
N GONORRHOEA DNA SPEC QL PROBE+SIG AMP: NEGATIVE
NUGENT SCORE: 0 (ref ?–6)
T VAGINALIS RRNA SPEC QL NAA+PROBE: NEGATIVE
YEAST VAG WET PREP-#/AREA: PRESENT

## 2025-08-11 RX ORDER — TERCONAZOLE 8 MG/G
1 CREAM VAGINAL NIGHTLY
Qty: 20 G | Refills: 0 | Status: SHIPPED | OUTPATIENT
Start: 2025-08-11 | End: 2025-08-14

## 2025-08-13 ENCOUNTER — ROUTINE PRENATAL (OUTPATIENT)
Dept: OBSTETRICS AND GYNECOLOGY | Facility: HOSPITAL | Age: 35
End: 2025-08-13
Payer: COMMERCIAL

## 2025-08-13 VITALS
SYSTOLIC BLOOD PRESSURE: 116 MMHG | DIASTOLIC BLOOD PRESSURE: 81 MMHG | BODY MASS INDEX: 25.06 KG/M2 | WEIGHT: 160 LBS | HEART RATE: 81 BPM

## 2025-08-13 DIAGNOSIS — Z3A.34 34 WEEKS GESTATION OF PREGNANCY (HHS-HCC): Primary | ICD-10-CM

## 2025-08-13 DIAGNOSIS — B37.9 YEAST INFECTION: ICD-10-CM

## 2025-08-13 PROCEDURE — 99212 OFFICE O/P EST SF 10 MIN: CPT

## 2025-08-13 RX ORDER — FLUCONAZOLE 150 MG/1
150 TABLET ORAL ONCE
Qty: 1 TABLET | Refills: 0 | Status: SHIPPED | OUTPATIENT
Start: 2025-08-13 | End: 2025-08-13

## 2025-08-13 ASSESSMENT — PAIN SCALES - GENERAL: PAINLEVEL_OUTOF10: 6

## 2025-08-14 DIAGNOSIS — F41.1 GAD (GENERALIZED ANXIETY DISORDER): ICD-10-CM

## 2025-08-15 RX ORDER — CLONAZEPAM 2 MG/1
TABLET ORAL
Qty: 120 TABLET | Refills: 0 | Status: SHIPPED | OUTPATIENT
Start: 2025-08-15 | End: 2025-09-14

## 2025-08-20 ENCOUNTER — APPOINTMENT (OUTPATIENT)
Dept: BEHAVIORAL HEALTH | Facility: CLINIC | Age: 35
End: 2025-08-20
Payer: COMMERCIAL

## 2025-08-20 ENCOUNTER — DOCUMENTATION (OUTPATIENT)
Dept: OBSTETRICS AND GYNECOLOGY | Facility: HOSPITAL | Age: 35
End: 2025-08-20

## 2025-08-20 DIAGNOSIS — F11.20: ICD-10-CM

## 2025-08-20 PROCEDURE — 99214 OFFICE O/P EST MOD 30 MIN: CPT | Performed by: STUDENT IN AN ORGANIZED HEALTH CARE EDUCATION/TRAINING PROGRAM

## 2025-08-20 RX ORDER — BUPRENORPHINE HYDROCHLORIDE 8 MG/1
8 TABLET SUBLINGUAL 4 TIMES DAILY
Qty: 28 TABLET | Refills: 1 | Status: SHIPPED | OUTPATIENT
Start: 2025-08-20 | End: 2025-08-20 | Stop reason: SDUPTHER

## 2025-08-20 RX ORDER — BUPRENORPHINE HYDROCHLORIDE 8 MG/1
8 TABLET SUBLINGUAL 4 TIMES DAILY
Qty: 28 TABLET | Refills: 1 | Status: SHIPPED | OUTPATIENT
Start: 2025-08-20 | End: 2025-09-03

## 2025-08-25 ENCOUNTER — HOSPITAL ENCOUNTER (OUTPATIENT)
Facility: HOSPITAL | Age: 35
Discharge: HOME | End: 2025-08-25
Attending: OBSTETRICS & GYNECOLOGY | Admitting: OBSTETRICS & GYNECOLOGY
Payer: COMMERCIAL

## 2025-08-25 VITALS
SYSTOLIC BLOOD PRESSURE: 115 MMHG | WEIGHT: 161.82 LBS | OXYGEN SATURATION: 98 % | BODY MASS INDEX: 25.4 KG/M2 | HEIGHT: 67 IN | HEART RATE: 101 BPM | RESPIRATION RATE: 18 BRPM | TEMPERATURE: 97.2 F | DIASTOLIC BLOOD PRESSURE: 83 MMHG

## 2025-08-25 PROBLEM — O99.713: Status: ACTIVE | Noted: 2025-08-25

## 2025-08-25 PROBLEM — L29.9: Status: ACTIVE | Noted: 2025-08-25

## 2025-08-25 LAB
ALBUMIN SERPL BCP-MCNC: 3.7 G/DL (ref 3.4–5)
ALP SERPL-CCNC: 158 U/L (ref 33–110)
ALT SERPL W P-5'-P-CCNC: 10 U/L (ref 7–45)
ANION GAP SERPL CALC-SCNC: 13 MMOL/L (ref 10–20)
AST SERPL W P-5'-P-CCNC: 17 U/L (ref 9–39)
BILIRUB SERPL-MCNC: 0.3 MG/DL (ref 0–1.2)
BILIRUBIN, POC: NEGATIVE
BLOOD URINE, POC: NEGATIVE
BUN SERPL-MCNC: 14 MG/DL (ref 6–23)
CALCIUM SERPL-MCNC: 9.1 MG/DL (ref 8.6–10.6)
CHLORIDE SERPL-SCNC: 101 MMOL/L (ref 98–107)
CLARITY, POC: CLEAR
CO2 SERPL-SCNC: 22 MMOL/L (ref 21–32)
COLOR, POC: YELLOW
CREAT SERPL-MCNC: 0.4 MG/DL (ref 0.5–1.05)
EGFRCR SERPLBLD CKD-EPI 2021: >90 ML/MIN/1.73M*2
ERYTHROCYTE [DISTWIDTH] IN BLOOD BY AUTOMATED COUNT: 12.5 % (ref 11.5–14.5)
GLUCOSE SERPL-MCNC: 82 MG/DL (ref 74–99)
GLUCOSE URINE, POC: NEGATIVE
HCT VFR BLD AUTO: 37.3 % (ref 36–46)
HGB BLD-MCNC: 12.2 G/DL (ref 12–16)
KETONES, POC: NEGATIVE
LEUKOCYTE EST, POC: NEGATIVE
MCH RBC QN AUTO: 29.9 PG (ref 26–34)
MCHC RBC AUTO-ENTMCNC: 32.7 G/DL (ref 32–36)
MCV RBC AUTO: 91 FL (ref 80–100)
NITRITE, POC: NEGATIVE
NRBC BLD-RTO: 0 /100 WBCS (ref 0–0)
PH, POC: 6
PLATELET # BLD AUTO: 183 X10*3/UL (ref 150–450)
POC APPEARANCE OF BODY FLUID: NORMAL
POTASSIUM SERPL-SCNC: 4.4 MMOL/L (ref 3.5–5.3)
PROT SERPL-MCNC: 6.6 G/DL (ref 6.4–8.2)
RBC # BLD AUTO: 4.08 X10*6/UL (ref 4–5.2)
SODIUM SERPL-SCNC: 132 MMOL/L (ref 136–145)
SPECIFIC GRAVITY, POC: 1.01
URINE PROTEIN, POC: NEGATIVE
UROBILINOGEN, POC: 0.2
WBC # BLD AUTO: 12.7 X10*3/UL (ref 4.4–11.3)

## 2025-08-25 PROCEDURE — 36415 COLL VENOUS BLD VENIPUNCTURE: CPT | Performed by: OBSTETRICS & GYNECOLOGY

## 2025-08-25 PROCEDURE — 59025 FETAL NON-STRESS TEST: CPT | Mod: GC

## 2025-08-25 PROCEDURE — 59025 FETAL NON-STRESS TEST: CPT

## 2025-08-25 PROCEDURE — 4500999001 HC ED NO CHARGE

## 2025-08-25 PROCEDURE — 99213 OFFICE O/P EST LOW 20 MIN: CPT

## 2025-08-25 PROCEDURE — 99215 OFFICE O/P EST HI 40 MIN: CPT | Mod: 25

## 2025-08-25 PROCEDURE — 85027 COMPLETE CBC AUTOMATED: CPT

## 2025-08-25 PROCEDURE — 36415 COLL VENOUS BLD VENIPUNCTURE: CPT

## 2025-08-25 PROCEDURE — 2500000002 HC RX 250 W HCPCS SELF ADMINISTERED DRUGS (ALT 637 FOR MEDICARE OP, ALT 636 FOR OP/ED): Mod: SE

## 2025-08-25 PROCEDURE — S4991 NICOTINE PATCH NONLEGEND: HCPCS | Mod: SE

## 2025-08-25 PROCEDURE — 2500000001 HC RX 250 WO HCPCS SELF ADMINISTERED DRUGS (ALT 637 FOR MEDICARE OP): Mod: SE

## 2025-08-25 PROCEDURE — 82239 BILE ACIDS TOTAL: CPT

## 2025-08-25 PROCEDURE — 80053 COMPREHEN METABOLIC PANEL: CPT

## 2025-08-25 RX ORDER — ONDANSETRON 4 MG/1
4 TABLET, FILM COATED ORAL EVERY 6 HOURS PRN
Status: DISCONTINUED | OUTPATIENT
Start: 2025-08-25 | End: 2025-08-25 | Stop reason: HOSPADM

## 2025-08-25 RX ORDER — CLONAZEPAM 0.5 MG/1
2 TABLET ORAL NIGHTLY PRN
Status: DISCONTINUED | OUTPATIENT
Start: 2025-08-25 | End: 2025-08-25 | Stop reason: HOSPADM

## 2025-08-25 RX ORDER — LABETALOL HYDROCHLORIDE 5 MG/ML
20 INJECTION, SOLUTION INTRAVENOUS ONCE AS NEEDED
Status: DISCONTINUED | OUTPATIENT
Start: 2025-08-25 | End: 2025-08-25 | Stop reason: HOSPADM

## 2025-08-25 RX ORDER — ACETAMINOPHEN 325 MG/1
975 TABLET ORAL ONCE
Status: COMPLETED | OUTPATIENT
Start: 2025-08-25 | End: 2025-08-25

## 2025-08-25 RX ORDER — CLONAZEPAM 0.5 MG/1
2 TABLET ORAL 3 TIMES DAILY PRN
Status: DISCONTINUED | OUTPATIENT
Start: 2025-08-25 | End: 2025-08-25 | Stop reason: HOSPADM

## 2025-08-25 RX ORDER — LIDOCAINE HYDROCHLORIDE 10 MG/ML
0.5 INJECTION, SOLUTION INFILTRATION; PERINEURAL ONCE AS NEEDED
Status: DISCONTINUED | OUTPATIENT
Start: 2025-08-25 | End: 2025-08-25 | Stop reason: HOSPADM

## 2025-08-25 RX ORDER — IBUPROFEN 200 MG
1 TABLET ORAL DAILY
Status: DISCONTINUED | OUTPATIENT
Start: 2025-08-25 | End: 2025-08-25 | Stop reason: HOSPADM

## 2025-08-25 RX ORDER — VALACYCLOVIR HYDROCHLORIDE 500 MG/1
500 TABLET, FILM COATED ORAL EVERY 12 HOURS SCHEDULED
Status: DISCONTINUED | OUTPATIENT
Start: 2025-08-25 | End: 2025-08-25 | Stop reason: HOSPADM

## 2025-08-25 RX ORDER — ONDANSETRON HYDROCHLORIDE 2 MG/ML
4 INJECTION, SOLUTION INTRAVENOUS EVERY 6 HOURS PRN
Status: DISCONTINUED | OUTPATIENT
Start: 2025-08-25 | End: 2025-08-25 | Stop reason: HOSPADM

## 2025-08-25 RX ORDER — BUPROPION HYDROCHLORIDE 150 MG/1
150 TABLET ORAL EVERY MORNING
Status: DISCONTINUED | OUTPATIENT
Start: 2025-08-25 | End: 2025-08-25 | Stop reason: HOSPADM

## 2025-08-25 RX ORDER — CYCLOBENZAPRINE HCL 10 MG
10 TABLET ORAL ONCE
Status: COMPLETED | OUTPATIENT
Start: 2025-08-25 | End: 2025-08-25

## 2025-08-25 RX ORDER — IBUPROFEN 200 MG
1 TABLET ORAL EVERY 24 HOURS
Status: DISCONTINUED | OUTPATIENT
Start: 2025-08-25 | End: 2025-08-25

## 2025-08-25 RX ORDER — HYDRALAZINE HYDROCHLORIDE 20 MG/ML
5 INJECTION INTRAMUSCULAR; INTRAVENOUS ONCE AS NEEDED
Status: DISCONTINUED | OUTPATIENT
Start: 2025-08-25 | End: 2025-08-25 | Stop reason: HOSPADM

## 2025-08-25 RX ORDER — BUPRENORPHINE HYDROCHLORIDE 8 MG/1
8 TABLET SUBLINGUAL 4 TIMES DAILY
Status: DISCONTINUED | OUTPATIENT
Start: 2025-08-25 | End: 2025-08-25 | Stop reason: HOSPADM

## 2025-08-25 RX ORDER — VALACYCLOVIR HYDROCHLORIDE 500 MG/1
500 TABLET, FILM COATED ORAL 2 TIMES DAILY
Qty: 180 TABLET | Refills: 3 | Status: CANCELLED | OUTPATIENT
Start: 2025-08-25 | End: 2026-08-25

## 2025-08-25 RX ADMIN — NICOTINE 1 PATCH: 21 PATCH, EXTENDED RELEASE TRANSDERMAL at 06:42

## 2025-08-25 RX ADMIN — ACETAMINOPHEN 975 MG: 325 TABLET ORAL at 03:54

## 2025-08-25 RX ADMIN — BUPRENORPHINE 8 MG: 8 TABLET SUBLINGUAL at 07:06

## 2025-08-25 RX ADMIN — CLONAZEPAM 2 MG: 0.5 TABLET ORAL at 07:06

## 2025-08-25 RX ADMIN — CYCLOBENZAPRINE 10 MG: 10 TABLET, FILM COATED ORAL at 03:26

## 2025-08-26 ENCOUNTER — ROUTINE PRENATAL (OUTPATIENT)
Dept: OBSTETRICS AND GYNECOLOGY | Facility: HOSPITAL | Age: 35
End: 2025-08-26
Payer: COMMERCIAL

## 2025-08-26 ENCOUNTER — HOSPITAL ENCOUNTER (OUTPATIENT)
Dept: RADIOLOGY | Facility: HOSPITAL | Age: 35
Discharge: HOME | End: 2025-08-26
Payer: COMMERCIAL

## 2025-08-26 VITALS — DIASTOLIC BLOOD PRESSURE: 82 MMHG | BODY MASS INDEX: 25.27 KG/M2 | WEIGHT: 161.4 LBS | SYSTOLIC BLOOD PRESSURE: 120 MMHG

## 2025-08-26 DIAGNOSIS — O47.9 UTERINE CONTRACTIONS DURING PREGNANCY (HHS-HCC): ICD-10-CM

## 2025-08-26 DIAGNOSIS — O09.299 H/O SHOULDER DYSTOCIA IN PRIOR PREGNANCY, CURRENTLY PREGNANT (HHS-HCC): ICD-10-CM

## 2025-08-26 DIAGNOSIS — Z87.19 HISTORY OF CHOLESTASIS DURING PREGNANCY: ICD-10-CM

## 2025-08-26 DIAGNOSIS — O26.893 VAGINAL DISCHARGE DURING PREGNANCY IN THIRD TRIMESTER (HHS-HCC): ICD-10-CM

## 2025-08-26 DIAGNOSIS — Z87.59 HISTORY OF CHOLESTASIS DURING PREGNANCY: ICD-10-CM

## 2025-08-26 DIAGNOSIS — D68.51 FACTOR V LEIDEN MUTATION AFFECTING PREGNANCY (MULTI): ICD-10-CM

## 2025-08-26 DIAGNOSIS — F41.8 POSTPARTUM ANXIETY: ICD-10-CM

## 2025-08-26 DIAGNOSIS — F11.20: ICD-10-CM

## 2025-08-26 DIAGNOSIS — N89.8 VAGINAL DISCHARGE DURING PREGNANCY IN THIRD TRIMESTER (HHS-HCC): ICD-10-CM

## 2025-08-26 DIAGNOSIS — Z3A.35 35 WEEKS GESTATION OF PREGNANCY (HHS-HCC): ICD-10-CM

## 2025-08-26 DIAGNOSIS — O99.119 FACTOR V LEIDEN MUTATION AFFECTING PREGNANCY (MULTI): ICD-10-CM

## 2025-08-26 DIAGNOSIS — F41.1 GAD (GENERALIZED ANXIETY DISORDER): ICD-10-CM

## 2025-08-26 DIAGNOSIS — O09.93 SUPERVISION OF HIGH RISK PREGNANCY IN THIRD TRIMESTER (HHS-HCC): Primary | ICD-10-CM

## 2025-08-26 PROBLEM — J00 ACUTE NASOPHARYNGITIS: Status: RESOLVED | Noted: 2025-04-09 | Resolved: 2025-08-26

## 2025-08-26 PROBLEM — J01.00 ACUTE NON-RECURRENT MAXILLARY SINUSITIS: Status: RESOLVED | Noted: 2025-04-16 | Resolved: 2025-08-26

## 2025-08-26 PROBLEM — F11.91 OPIOID USE DISORDER IN REMISSION: Status: ACTIVE | Noted: 2025-06-17

## 2025-08-26 LAB
BILE AC SERPL-SCNC: 4 UMOL/L (ref 0–10)
HOLD SPECIMEN: NORMAL

## 2025-08-26 PROCEDURE — 76816 OB US FOLLOW-UP PER FETUS: CPT | Performed by: OBSTETRICS & GYNECOLOGY

## 2025-08-26 PROCEDURE — 99215 OFFICE O/P EST HI 40 MIN: CPT | Performed by: STUDENT IN AN ORGANIZED HEALTH CARE EDUCATION/TRAINING PROGRAM

## 2025-08-26 PROCEDURE — 76819 FETAL BIOPHYS PROFIL W/O NST: CPT

## 2025-08-26 PROCEDURE — 76819 FETAL BIOPHYS PROFIL W/O NST: CPT | Performed by: OBSTETRICS & GYNECOLOGY

## 2025-08-26 PROCEDURE — 99212 OFFICE O/P EST SF 10 MIN: CPT | Mod: 25

## 2025-08-26 PROCEDURE — 76816 OB US FOLLOW-UP PER FETUS: CPT

## 2025-08-27 ENCOUNTER — APPOINTMENT (OUTPATIENT)
Dept: RADIOLOGY | Facility: HOSPITAL | Age: 35
End: 2025-08-27
Payer: COMMERCIAL

## 2025-08-29 ENCOUNTER — ANESTHESIA EVENT (OUTPATIENT)
Dept: OBSTETRICS AND GYNECOLOGY | Facility: HOSPITAL | Age: 35
End: 2025-08-29
Payer: COMMERCIAL

## 2025-08-29 ENCOUNTER — HOSPITAL ENCOUNTER (INPATIENT)
Facility: HOSPITAL | Age: 35
End: 2025-08-29
Attending: OBSTETRICS & GYNECOLOGY | Admitting: NURSE PRACTITIONER
Payer: COMMERCIAL

## 2025-08-29 ENCOUNTER — ANESTHESIA (OUTPATIENT)
Dept: OBSTETRICS AND GYNECOLOGY | Facility: HOSPITAL | Age: 35
End: 2025-08-29
Payer: COMMERCIAL

## 2025-08-29 PROBLEM — D68.51 FACTOR V LEIDEN (MULTI): Status: ACTIVE | Noted: 2025-08-29

## 2025-08-29 PROBLEM — O47.03 PRETERM UTERINE CONTRACTIONS IN THIRD TRIMESTER, ANTEPARTUM (HHS-HCC): Status: ACTIVE | Noted: 2025-08-29

## 2025-08-29 PROBLEM — G89.29 CHRONIC NECK PAIN: Status: ACTIVE | Noted: 2025-08-29

## 2025-08-29 PROBLEM — M54.2 CHRONIC NECK PAIN: Status: ACTIVE | Noted: 2025-08-29

## 2025-08-29 LAB — GP B STREP SPEC QL CULT: ABNORMAL

## 2025-08-29 SDOH — HEALTH STABILITY: MENTAL HEALTH: SUICIDAL BEHAVIOR (LIFETIME): NO

## 2025-08-29 SDOH — ECONOMIC STABILITY: TRANSPORTATION INSECURITY: IN THE PAST 12 MONTHS, HAS LACK OF TRANSPORTATION KEPT YOU FROM MEDICAL APPOINTMENTS OR FROM GETTING MEDICATIONS?: NO

## 2025-08-29 SDOH — SOCIAL STABILITY: SOCIAL INSECURITY: ARE THERE ANY APPARENT SIGNS OF INJURIES/BEHAVIORS THAT COULD BE RELATED TO ABUSE/NEGLECT?: NO

## 2025-08-29 SDOH — SOCIAL STABILITY: SOCIAL INSECURITY: HAS ANYONE EVER THREATENED TO HURT YOUR FAMILY OR YOUR PETS?: NO

## 2025-08-29 SDOH — SOCIAL STABILITY: SOCIAL INSECURITY: ARE YOU OR HAVE YOU BEEN THREATENED OR ABUSED PHYSICALLY, EMOTIONALLY, OR SEXUALLY BY ANYONE?: NO

## 2025-08-29 SDOH — SOCIAL STABILITY: SOCIAL INSECURITY: DOES ANYONE TRY TO KEEP YOU FROM HAVING/CONTACTING OTHER FRIENDS OR DOING THINGS OUTSIDE YOUR HOME?: NO

## 2025-08-29 SDOH — ECONOMIC STABILITY: FOOD INSECURITY: WITHIN THE PAST 12 MONTHS, YOU WORRIED THAT YOUR FOOD WOULD RUN OUT BEFORE YOU GOT THE MONEY TO BUY MORE.: NEVER TRUE

## 2025-08-29 SDOH — ECONOMIC STABILITY: FOOD INSECURITY: WITHIN THE PAST 12 MONTHS, THE FOOD YOU BOUGHT JUST DIDN'T LAST AND YOU DIDN'T HAVE MONEY TO GET MORE.: NEVER TRUE

## 2025-08-29 SDOH — SOCIAL STABILITY: SOCIAL INSECURITY: WITHIN THE LAST YEAR, HAVE YOU BEEN AFRAID OF YOUR PARTNER OR EX-PARTNER?: NO

## 2025-08-29 SDOH — HEALTH STABILITY: MENTAL HEALTH: NON-SPECIFIC ACTIVE SUICIDAL THOUGHTS (PAST 1 MONTH): NO

## 2025-08-29 SDOH — HEALTH STABILITY: MENTAL HEALTH: HAVE YOU USED ANY SUBSTANCES (CANABIS, COCAINE, HEROIN, HALLUCINOGENS, INHALANTS, ETC.) IN THE PAST 12 MONTHS?: NO

## 2025-08-29 SDOH — HEALTH STABILITY: MENTAL HEALTH: WISH TO BE DEAD (PAST 1 MONTH): NO

## 2025-08-29 SDOH — SOCIAL STABILITY: SOCIAL INSECURITY: WITHIN THE LAST YEAR, HAVE YOU BEEN HUMILIATED OR EMOTIONALLY ABUSED IN OTHER WAYS BY YOUR PARTNER OR EX-PARTNER?: NO

## 2025-08-29 SDOH — ECONOMIC STABILITY: FOOD INSECURITY: HOW HARD IS IT FOR YOU TO PAY FOR THE VERY BASICS LIKE FOOD, HOUSING, MEDICAL CARE, AND HEATING?: NOT VERY HARD

## 2025-08-29 SDOH — SOCIAL STABILITY: SOCIAL INSECURITY: DO YOU FEEL ANYONE HAS EXPLOITED OR TAKEN ADVANTAGE OF YOU FINANCIALLY OR OF YOUR PERSONAL PROPERTY?: NO

## 2025-08-29 SDOH — SOCIAL STABILITY: SOCIAL INSECURITY: PHYSICAL ABUSE: DENIES

## 2025-08-29 SDOH — SOCIAL STABILITY: SOCIAL INSECURITY: HAVE YOU HAD ANY THOUGHTS OF HARMING ANYONE ELSE?: NO

## 2025-08-29 SDOH — HEALTH STABILITY: MENTAL HEALTH: HAVE YOU USED ANY PRESCRIPTION DRUGS OTHER THAN PRESCRIBED IN THE PAST 12 MONTHS?: NO

## 2025-08-29 SDOH — HEALTH STABILITY: MENTAL HEALTH: WERE YOU ABLE TO COMPLETE ALL THE BEHAVIORAL HEALTH SCREENINGS?: YES

## 2025-08-29 SDOH — SOCIAL STABILITY: SOCIAL INSECURITY: ABUSE SCREEN: ADULT

## 2025-08-29 SDOH — SOCIAL STABILITY: SOCIAL INSECURITY: VERBAL ABUSE: DENIES

## 2025-08-29 SDOH — ECONOMIC STABILITY: HOUSING INSECURITY: DO YOU FEEL UNSAFE GOING BACK TO THE PLACE WHERE YOU ARE LIVING?: NO

## 2025-08-29 SDOH — SOCIAL STABILITY: SOCIAL INSECURITY: HAVE YOU HAD THOUGHTS OF HARMING ANYONE ELSE?: NO

## 2025-08-29 ASSESSMENT — PAIN DESCRIPTION - DESCRIPTORS
DESCRIPTORS: CRAMPING;DISCOMFORT
DESCRIPTORS: CRAMPING

## 2025-08-29 ASSESSMENT — PAIN SCALES - GENERAL
PAINLEVEL_OUTOF10: 0 - NO PAIN
PAINLEVEL_OUTOF10: 6

## 2025-08-29 ASSESSMENT — LIFESTYLE VARIABLES
AUDIT-C TOTAL SCORE: 0
HOW OFTEN DO YOU HAVE 6 OR MORE DRINKS ON ONE OCCASION: NEVER
AUDIT-C TOTAL SCORE: 0
SKIP TO QUESTIONS 9-10: 1
HOW OFTEN DO YOU HAVE A DRINK CONTAINING ALCOHOL: NEVER
HOW MANY STANDARD DRINKS CONTAINING ALCOHOL DO YOU HAVE ON A TYPICAL DAY: PATIENT DOES NOT DRINK

## 2025-08-29 ASSESSMENT — ACTIVITIES OF DAILY LIVING (ADL)
LACK_OF_TRANSPORTATION: NO
LACK_OF_TRANSPORTATION: NO

## 2025-08-30 ENCOUNTER — CLINICAL DOCUMENTATION ONLY (OUTPATIENT)
Dept: NURSERY | Facility: HOSPITAL | Age: 35
End: 2025-08-30

## 2025-08-30 ASSESSMENT — PAIN - FUNCTIONAL ASSESSMENT: PAIN_FUNCTIONAL_ASSESSMENT: 0-10

## 2025-08-30 ASSESSMENT — PAIN SCALES - GENERAL
PAINLEVEL_OUTOF10: 3
PAINLEVEL_OUTOF10: 0 - NO PAIN
PAINLEVEL_OUTOF10: 4
PAINLEVEL_OUTOF10: 0 - NO PAIN

## 2025-08-31 ENCOUNTER — ANESTHESIA (OUTPATIENT)
Dept: OBSTETRICS AND GYNECOLOGY | Facility: HOSPITAL | Age: 35
End: 2025-08-31
Payer: COMMERCIAL

## 2025-08-31 ENCOUNTER — ANESTHESIA EVENT (OUTPATIENT)
Dept: OBSTETRICS AND GYNECOLOGY | Facility: HOSPITAL | Age: 35
End: 2025-08-31
Payer: COMMERCIAL

## 2025-08-31 PROCEDURE — 3700000014 EPIDURAL BLOCK: Mod: GC

## 2025-08-31 PROCEDURE — 2500000004 HC RX 250 GENERAL PHARMACY W/ HCPCS (ALT 636 FOR OP/ED): Mod: JZ,SE

## 2025-08-31 PROCEDURE — 01967 NEURAXL LBR ANES VAG DLVR: CPT | Performed by: STUDENT IN AN ORGANIZED HEALTH CARE EDUCATION/TRAINING PROGRAM

## 2025-08-31 RX ADMIN — Medication 10 ML/HR: at 20:28

## 2025-08-31 RX ADMIN — Medication 10 ML/HR: at 14:47

## 2025-08-31 RX ADMIN — Medication 5 ML: at 14:46

## 2025-08-31 ASSESSMENT — PAIN SCALES - GENERAL
PAINLEVEL_OUTOF10: 0 - NO PAIN
PAINLEVEL_OUTOF10: 2
PAINLEVEL_OUTOF10: 3
PAINLEVEL_OUTOF10: 7
PAINLEVEL_OUTOF10: 4
PAINLEVEL_OUTOF10: 4
PAINLEVEL_OUTOF10: 7
PAINLEVEL_OUTOF10: 0 - NO PAIN
PAINLEVEL_OUTOF10: 3
PAINLEVEL_OUTOF10: 4
PAINLEVEL_OUTOF10: 3
PAINLEVEL_OUTOF10: 2
PAINLEVEL_OUTOF10: 4
PAINLEVEL_OUTOF10: 2
PAINLEVEL_OUTOF10: 7

## 2025-08-31 ASSESSMENT — PAIN DESCRIPTION - DESCRIPTORS
DESCRIPTORS: ACHING
DESCRIPTORS: ACHING

## 2025-08-31 ASSESSMENT — PAIN - FUNCTIONAL ASSESSMENT: PAIN_FUNCTIONAL_ASSESSMENT: 0-10

## 2025-09-01 ASSESSMENT — PAIN DESCRIPTION - DESCRIPTORS
DESCRIPTORS: CRAMPING

## 2025-09-01 ASSESSMENT — PAIN DESCRIPTION - LOCATION: LOCATION: ABDOMEN

## 2025-09-01 ASSESSMENT — PAIN SCALES - GENERAL
PAINLEVEL_OUTOF10: 0 - NO PAIN
PAINLEVEL_OUTOF10: 3

## 2025-09-01 ASSESSMENT — PAIN - FUNCTIONAL ASSESSMENT: PAIN_FUNCTIONAL_ASSESSMENT: 0-10

## 2025-09-02 ENCOUNTER — PHARMACY VISIT (OUTPATIENT)
Dept: PHARMACY | Facility: CLINIC | Age: 35
End: 2025-09-02
Payer: MEDICAID

## 2025-09-02 PROCEDURE — RXMED WILLOW AMBULATORY MEDICATION CHARGE

## 2025-09-02 RX ORDER — BUPRENORPHINE HYDROCHLORIDE 8 MG/1
8 TABLET SUBLINGUAL 4 TIMES DAILY
Qty: 28 TABLET | Refills: 0 | Status: SHIPPED | OUTPATIENT
Start: 2025-09-02 | End: 2025-09-05 | Stop reason: SDUPTHER

## 2025-09-02 RX ORDER — LORAZEPAM 2 MG/1
2 TABLET ORAL EVERY 8 HOURS PRN
Qty: 21 TABLET | Refills: 0 | OUTPATIENT
Start: 2025-09-02 | End: 2025-09-02 | Stop reason: HOSPADM

## 2025-09-02 ASSESSMENT — PAIN DESCRIPTION - DESCRIPTORS: DESCRIPTORS: ACHING

## 2025-09-02 ASSESSMENT — PAIN SCALES - GENERAL
PAINLEVEL_OUTOF10: 1
PAINLEVEL_OUTOF10: 3

## 2025-09-05 ENCOUNTER — APPOINTMENT (OUTPATIENT)
Dept: OBSTETRICS AND GYNECOLOGY | Facility: CLINIC | Age: 35
End: 2025-09-05
Payer: COMMERCIAL

## 2025-09-05 DIAGNOSIS — F41.1 GAD (GENERALIZED ANXIETY DISORDER): ICD-10-CM

## 2025-09-05 DIAGNOSIS — F11.20: ICD-10-CM

## 2025-09-05 DIAGNOSIS — F41.8 POSTPARTUM ANXIETY: ICD-10-CM

## 2025-09-05 RX ORDER — LORAZEPAM 2 MG/1
2 TABLET ORAL EVERY 8 HOURS PRN
Qty: 42 TABLET | Refills: 0 | Status: SHIPPED | OUTPATIENT
Start: 2025-09-06 | End: 2025-09-07 | Stop reason: SDUPTHER

## 2025-09-05 RX ORDER — BUPRENORPHINE HYDROCHLORIDE 8 MG/1
8 TABLET SUBLINGUAL 4 TIMES DAILY
Qty: 120 TABLET | Refills: 0 | Status: SHIPPED | OUTPATIENT
Start: 2025-09-06 | End: 2025-09-07 | Stop reason: SDUPTHER

## 2025-09-07 DIAGNOSIS — F41.1 GAD (GENERALIZED ANXIETY DISORDER): ICD-10-CM

## 2025-09-07 DIAGNOSIS — F11.20: ICD-10-CM

## 2025-09-07 DIAGNOSIS — F41.8 POSTPARTUM ANXIETY: ICD-10-CM

## 2025-09-07 RX ORDER — BUPRENORPHINE HYDROCHLORIDE 8 MG/1
8 TABLET SUBLINGUAL 4 TIMES DAILY
Qty: 120 TABLET | Refills: 0 | Status: SHIPPED | OUTPATIENT
Start: 2025-09-07 | End: 2025-10-07

## 2025-09-07 RX ORDER — LORAZEPAM 2 MG/1
2 TABLET ORAL EVERY 8 HOURS PRN
Qty: 42 TABLET | Refills: 0 | Status: SHIPPED | OUTPATIENT
Start: 2025-09-07 | End: 2025-09-21

## 2025-09-08 ENCOUNTER — APPOINTMENT (OUTPATIENT)
Dept: HEMATOLOGY/ONCOLOGY | Facility: CLINIC | Age: 35
End: 2025-09-08
Payer: COMMERCIAL

## 2025-09-12 ENCOUNTER — APPOINTMENT (OUTPATIENT)
Dept: OBSTETRICS AND GYNECOLOGY | Facility: CLINIC | Age: 35
End: 2025-09-12
Payer: COMMERCIAL

## 2025-09-16 ENCOUNTER — APPOINTMENT (OUTPATIENT)
Dept: OBSTETRICS AND GYNECOLOGY | Facility: HOSPITAL | Age: 35
End: 2025-09-16
Payer: COMMERCIAL

## 2025-09-23 ENCOUNTER — APPOINTMENT (OUTPATIENT)
Dept: OBSTETRICS AND GYNECOLOGY | Facility: HOSPITAL | Age: 35
End: 2025-09-23
Payer: COMMERCIAL

## 2025-10-03 ENCOUNTER — APPOINTMENT (OUTPATIENT)
Dept: OBSTETRICS AND GYNECOLOGY | Facility: CLINIC | Age: 35
End: 2025-10-03
Payer: COMMERCIAL